# Patient Record
Sex: MALE | Race: WHITE | Employment: FULL TIME | URBAN - METROPOLITAN AREA
[De-identification: names, ages, dates, MRNs, and addresses within clinical notes are randomized per-mention and may not be internally consistent; named-entity substitution may affect disease eponyms.]

---

## 2021-03-21 ENCOUNTER — APPOINTMENT (OUTPATIENT)
Dept: CT IMAGING | Age: 51
DRG: 872 | End: 2021-03-21
Payer: COMMERCIAL

## 2021-03-21 ENCOUNTER — APPOINTMENT (OUTPATIENT)
Dept: ULTRASOUND IMAGING | Age: 51
DRG: 872 | End: 2021-03-21
Payer: COMMERCIAL

## 2021-03-21 ENCOUNTER — HOSPITAL ENCOUNTER (INPATIENT)
Age: 51
LOS: 3 days | Discharge: HOME | DRG: 872 | End: 2021-03-24
Attending: EMERGENCY MEDICINE | Admitting: FAMILY MEDICINE
Payer: COMMERCIAL

## 2021-03-21 DIAGNOSIS — L03.116 CELLULITIS OF LEFT LOWER EXTREMITY: Primary | ICD-10-CM

## 2021-03-21 DIAGNOSIS — D72.829 LEUKOCYTOSIS, UNSPECIFIED TYPE: ICD-10-CM

## 2021-03-21 PROBLEM — L03.90 CELLULITIS: Status: ACTIVE | Noted: 2021-03-21

## 2021-03-21 LAB
ANION GAP SERPL CALCULATED.3IONS-SCNC: 12 MMOL/L (ref 7–16)
BASOPHILS ABSOLUTE: 0.02 E9/L (ref 0–0.2)
BASOPHILS RELATIVE PERCENT: 0.2 % (ref 0–2)
BUN BLDV-MCNC: 13 MG/DL (ref 6–20)
C-REACTIVE PROTEIN: 18.1 MG/DL (ref 0–0.4)
CALCIUM SERPL-MCNC: 9 MG/DL (ref 8.6–10.2)
CHLORIDE BLD-SCNC: 95 MMOL/L (ref 98–107)
CO2: 25 MMOL/L (ref 22–29)
CREAT SERPL-MCNC: 0.9 MG/DL (ref 0.7–1.2)
EOSINOPHILS ABSOLUTE: 0.01 E9/L (ref 0.05–0.5)
EOSINOPHILS RELATIVE PERCENT: 0.1 % (ref 0–6)
GFR AFRICAN AMERICAN: >60
GFR NON-AFRICAN AMERICAN: >60 ML/MIN/1.73
GLUCOSE BLD-MCNC: 111 MG/DL (ref 74–99)
HCT VFR BLD CALC: 42.4 % (ref 37–54)
HEMOGLOBIN: 14.2 G/DL (ref 12.5–16.5)
IMMATURE GRANULOCYTES #: 0.06 E9/L
IMMATURE GRANULOCYTES %: 0.5 % (ref 0–5)
LACTIC ACID, SEPSIS: 1.4 MMOL/L (ref 0.5–1.9)
LYMPHOCYTES ABSOLUTE: 1.05 E9/L (ref 1.5–4)
LYMPHOCYTES RELATIVE PERCENT: 8.8 % (ref 20–42)
MCH RBC QN AUTO: 29.3 PG (ref 26–35)
MCHC RBC AUTO-ENTMCNC: 33.5 % (ref 32–34.5)
MCV RBC AUTO: 87.4 FL (ref 80–99.9)
MONOCYTES ABSOLUTE: 1.29 E9/L (ref 0.1–0.95)
MONOCYTES RELATIVE PERCENT: 10.8 % (ref 2–12)
NEUTROPHILS ABSOLUTE: 9.52 E9/L (ref 1.8–7.3)
NEUTROPHILS RELATIVE PERCENT: 79.6 % (ref 43–80)
PDW BLD-RTO: 12.5 FL (ref 11.5–15)
PLATELET # BLD: 168 E9/L (ref 130–450)
PMV BLD AUTO: 10 FL (ref 7–12)
POTASSIUM REFLEX MAGNESIUM: 3.6 MMOL/L (ref 3.5–5)
RBC # BLD: 4.85 E12/L (ref 3.8–5.8)
SEDIMENTATION RATE, ERYTHROCYTE: 70 MM/HR (ref 0–15)
SODIUM BLD-SCNC: 132 MMOL/L (ref 132–146)
WBC # BLD: 12 E9/L (ref 4.5–11.5)

## 2021-03-21 PROCEDURE — 93971 EXTREMITY STUDY: CPT

## 2021-03-21 PROCEDURE — 73702 CT LWR EXTREMITY W/O&W/DYE: CPT

## 2021-03-21 PROCEDURE — 80048 BASIC METABOLIC PNL TOTAL CA: CPT

## 2021-03-21 PROCEDURE — 36415 COLL VENOUS BLD VENIPUNCTURE: CPT

## 2021-03-21 PROCEDURE — 85025 COMPLETE CBC W/AUTO DIFF WBC: CPT

## 2021-03-21 PROCEDURE — 1200000000 HC SEMI PRIVATE

## 2021-03-21 PROCEDURE — 96365 THER/PROPH/DIAG IV INF INIT: CPT

## 2021-03-21 PROCEDURE — 6370000000 HC RX 637 (ALT 250 FOR IP): Performed by: FAMILY MEDICINE

## 2021-03-21 PROCEDURE — 86140 C-REACTIVE PROTEIN: CPT

## 2021-03-21 PROCEDURE — 83605 ASSAY OF LACTIC ACID: CPT

## 2021-03-21 PROCEDURE — 2500000003 HC RX 250 WO HCPCS: Performed by: STUDENT IN AN ORGANIZED HEALTH CARE EDUCATION/TRAINING PROGRAM

## 2021-03-21 PROCEDURE — 85651 RBC SED RATE NONAUTOMATED: CPT

## 2021-03-21 PROCEDURE — 99284 EMERGENCY DEPT VISIT MOD MDM: CPT

## 2021-03-21 PROCEDURE — 93971 EXTREMITY STUDY: CPT | Performed by: RADIOLOGY

## 2021-03-21 PROCEDURE — 2580000003 HC RX 258: Performed by: STUDENT IN AN ORGANIZED HEALTH CARE EDUCATION/TRAINING PROGRAM

## 2021-03-21 PROCEDURE — 6360000004 HC RX CONTRAST MEDICATION: Performed by: STUDENT IN AN ORGANIZED HEALTH CARE EDUCATION/TRAINING PROGRAM

## 2021-03-21 PROCEDURE — 2500000003 HC RX 250 WO HCPCS: Performed by: FAMILY MEDICINE

## 2021-03-21 PROCEDURE — 6360000002 HC RX W HCPCS: Performed by: FAMILY MEDICINE

## 2021-03-21 PROCEDURE — 6360000002 HC RX W HCPCS: Performed by: INTERNAL MEDICINE

## 2021-03-21 PROCEDURE — 87040 BLOOD CULTURE FOR BACTERIA: CPT

## 2021-03-21 RX ORDER — ATORVASTATIN CALCIUM 40 MG/1
40 TABLET, FILM COATED ORAL DAILY
COMMUNITY

## 2021-03-21 RX ORDER — SODIUM CHLORIDE 0.9 % (FLUSH) 0.9 %
10 SYRINGE (ML) INJECTION ONCE
Status: COMPLETED | OUTPATIENT
Start: 2021-03-21 | End: 2021-03-21

## 2021-03-21 RX ORDER — TAMSULOSIN HYDROCHLORIDE 0.4 MG/1
0.8 CAPSULE ORAL DAILY
COMMUNITY

## 2021-03-21 RX ORDER — CETIRIZINE HYDROCHLORIDE 5 MG/1
10 TABLET ORAL DAILY
COMMUNITY

## 2021-03-21 RX ORDER — HYDROCODONE BITARTRATE AND ACETAMINOPHEN 5; 325 MG/1; MG/1
1 TABLET ORAL EVERY 4 HOURS PRN
Status: DISCONTINUED | OUTPATIENT
Start: 2021-03-21 | End: 2021-03-24 | Stop reason: HOSPADM

## 2021-03-21 RX ORDER — SULFAMETHOXAZOLE AND TRIMETHOPRIM 800; 160 MG/1; MG/1
1 TABLET ORAL 2 TIMES DAILY
COMMUNITY
End: 2021-04-20 | Stop reason: ALTCHOICE

## 2021-03-21 RX ORDER — ALBUTEROL SULFATE 0.63 MG/3ML
1 SOLUTION RESPIRATORY (INHALATION) EVERY 6 HOURS PRN
COMMUNITY
End: 2022-05-16

## 2021-03-21 RX ORDER — CLINDAMYCIN PHOSPHATE 600 MG/50ML
600 INJECTION INTRAVENOUS EVERY 8 HOURS
Status: DISCONTINUED | OUTPATIENT
Start: 2021-03-21 | End: 2021-03-23

## 2021-03-21 RX ORDER — CLINDAMYCIN PHOSPHATE 600 MG/50ML
600 INJECTION INTRAVENOUS ONCE
Status: COMPLETED | OUTPATIENT
Start: 2021-03-21 | End: 2021-03-21

## 2021-03-21 RX ORDER — MONTELUKAST SODIUM 10 MG/1
10 TABLET ORAL NIGHTLY
COMMUNITY

## 2021-03-21 RX ADMIN — Medication 2000 MG: at 20:16

## 2021-03-21 RX ADMIN — HYDROCODONE BITARTRATE AND ACETAMINOPHEN 1 TABLET: 5; 325 TABLET ORAL at 16:20

## 2021-03-21 RX ADMIN — CLINDAMYCIN PHOSPHATE 600 MG: 600 INJECTION, SOLUTION INTRAVENOUS at 18:18

## 2021-03-21 RX ADMIN — IOPAMIDOL 90 ML: 755 INJECTION, SOLUTION INTRAVENOUS at 11:02

## 2021-03-21 RX ADMIN — HYDROCODONE BITARTRATE AND ACETAMINOPHEN 1 TABLET: 5; 325 TABLET ORAL at 20:15

## 2021-03-21 RX ADMIN — CLINDAMYCIN PHOSPHATE 600 MG: 600 INJECTION, SOLUTION INTRAVENOUS at 10:51

## 2021-03-21 RX ADMIN — ENOXAPARIN SODIUM 40 MG: 40 INJECTION SUBCUTANEOUS at 20:15

## 2021-03-21 RX ADMIN — Medication 10 ML: at 11:02

## 2021-03-21 ASSESSMENT — PAIN SCALES - GENERAL: PAINLEVEL_OUTOF10: 8

## 2021-03-21 ASSESSMENT — PAIN DESCRIPTION - DESCRIPTORS
DESCRIPTORS: DISCOMFORT;SORE;DULL
DESCRIPTORS: DISCOMFORT

## 2021-03-21 ASSESSMENT — PAIN DESCRIPTION - ORIENTATION
ORIENTATION: LEFT
ORIENTATION: LEFT

## 2021-03-21 ASSESSMENT — PAIN DESCRIPTION - LOCATION: LOCATION: LEG

## 2021-03-21 ASSESSMENT — PAIN DESCRIPTION - ONSET: ONSET: ON-GOING

## 2021-03-21 NOTE — ED PROVIDER NOTES
Department of Emergency Medicine   ED  Provider Note  Admit Date/RoomTime: 3/21/2021  6:36 AM  ED Room: [facility]          History of Present Illness:  3/21/21, Time: 6:37 AM EDT  Chief Complaint   Patient presents with    Cellulitis     left leg. Yoko Zuñiga is a 46 y.o. male presenting to the ED for cellulitis, beginning several days ago. The complaint has been constant, moderate in severity, and worsened by nothing. The patient was given bactrim from urgent care. There are no alleviating factors. The patient is a 68-year-old male with a history of hyperlipidemia and BPH who presents to the emergency department complaining of cellulitis of the left leg. The patient states that over the past several days he has noticed increased redness of the left lower extremity. The patient states that he went to urgent care center yesterday and they did give him a shot of antibiotics and he also was sent home on Bactrim. He states he has been taking that as prescribed. However, last night he developed a temperature over 100. The patient states that he had something similar many years ago that resolved with oral antibiotics. The patient states he also has been experiencing intermittent fatigue and chills. He denies any chest pain, lightheadedness, dizziness, syncope, history of blood clots or bleeding disorders, history of DVT or PE, history of diabetes, recent hospitalization, recent illness, or other acute symptoms or concerns. Review of Systems:   Review of Systems   Constitutional: Positive for fatigue and fever. Negative for chills. HENT: Negative for congestion. Eyes: Negative for redness. Respiratory: Negative for shortness of breath. Cardiovascular: Negative for chest pain. Gastrointestinal: Negative for abdominal pain, nausea and vomiting. Genitourinary: Negative for dysuria. Musculoskeletal: Negative for arthralgias. Skin: Positive for color change and rash.  Negative for wound. Neurological: Negative for light-headedness. Psychiatric/Behavioral: Negative for confusion. All other systems reviewed and are negative.          --------------------------------------------- PAST HISTORY ---------------------------------------------  Past Medical History:  has a past medical history of Asthma. Past Surgical History:  has no past surgical history on file. Social History:  reports that he has never smoked. He has never used smokeless tobacco. He reports previous alcohol use. He reports that he does not use drugs. Family History: family history is not on file. . Unless otherwise noted, family history is non contributory    The patients home medications have been reviewed. Allergies: Neosporin plus max st and Other    I have reviewed the past medical history, past surgical history, social history, and family history    ---------------------------------------------------PHYSICAL EXAM--------------------------------------    Constitutional/General: Alert and oriented x3  Head: Normocephalic and atraumatic  Eyes: PERRL, EOMI, sclera non icteric  Mouth: Oropharynx clear, handling secretions, no trismus, no asymmetry of the posterior oropharynx or uvular edema  Neck: Supple, full ROM, no stridor, no meningeal signs  Respiratory: Lungs clear to auscultation bilaterally, no wheezes, rales, or rhonchi. Not in respiratory distress  Cardiovascular:  Regular rate. Regular rhythm. No murmurs, no aortic murmurs, no gallops, or rubs. Chest: No chest wall tenderness  Gastrointestinal:  Abdomen Soft, Non tender, Non distended. No rebound, guarding, or rigidity. No pulsatile masses. Musculoskeletal: Moves all extremities x 4. Warm and well perfused, no clubbing, no cyanosis. Capillary refill <3 seconds. Moderate edema of the left lower extremity with erythema and warmth. It is tender to palpation. DP pulses are +2 bilaterally. Compartments are soft and compressible.   There are several ulcerations over the medial and lateral aspect. There is no active drainage or discharge. Skin: skin warm and dry. No rashes. Neurologic: GCS 15, no focal deficits, symmetric strength 5/5 in the upper and lower extremities bilaterally  Psychiatric: Normal Affect                  -------------------------------------------------- RESULTS -------------------------------------------------  I have personally reviewed all laboratory and imaging results for this patient. Results are listed below.      LABS: (Lab results interpreted by me)  Results for orders placed or performed during the hospital encounter of 03/21/21   CBC auto differential   Result Value Ref Range    WBC 12.0 (H) 4.5 - 11.5 E9/L    RBC 4.85 3.80 - 5.80 E12/L    Hemoglobin 14.2 12.5 - 16.5 g/dL    Hematocrit 42.4 37.0 - 54.0 %    MCV 87.4 80.0 - 99.9 fL    MCH 29.3 26.0 - 35.0 pg    MCHC 33.5 32.0 - 34.5 %    RDW 12.5 11.5 - 15.0 fL    Platelets 878 954 - 814 E9/L    MPV 10.0 7.0 - 12.0 fL    Neutrophils % 79.6 43.0 - 80.0 %    Immature Granulocytes % 0.5 0.0 - 5.0 %    Lymphocytes % 8.8 (L) 20.0 - 42.0 %    Monocytes % 10.8 2.0 - 12.0 %    Eosinophils % 0.1 0.0 - 6.0 %    Basophils % 0.2 0.0 - 2.0 %    Neutrophils Absolute 9.52 (H) 1.80 - 7.30 E9/L    Immature Granulocytes # 0.06 E9/L    Lymphocytes Absolute 1.05 (L) 1.50 - 4.00 E9/L    Monocytes Absolute 1.29 (H) 0.10 - 0.95 E9/L    Eosinophils Absolute 0.01 (L) 0.05 - 0.50 E9/L    Basophils Absolute 0.02 0.00 - 0.20 Q9/I   Basic Metabolic Panel w/ Reflex to MG   Result Value Ref Range    Sodium 132 132 - 146 mmol/L    Potassium reflex Magnesium 3.6 3.5 - 5.0 mmol/L    Chloride 95 (L) 98 - 107 mmol/L    CO2 25 22 - 29 mmol/L    Anion Gap 12 7 - 16 mmol/L    Glucose 111 (H) 74 - 99 mg/dL    BUN 13 6 - 20 mg/dL    CREATININE 0.9 0.7 - 1.2 mg/dL    GFR Non-African American >60 >=60 mL/min/1.73    GFR African American >60     Calcium 9.0 8.6 - 10.2 mg/dL   Lactate, Sepsis   Result Value Ref Range    Lactic Acid, Sepsis 1.4 0.5 - 1.9 mmol/L   SEDIMENTATION RATE   Result Value Ref Range    Sed Rate 70 (H) 0 - 15 mm/Hr   C-REACTIVE PROTEIN   Result Value Ref Range    CRP 18.1 (H) 0.0 - 0.4 mg/dL   ,       RADIOLOGY:  Interpreted by Radiologist unless otherwise specified  CT TIBIA FIBULA LEFT W WO CONTRAST   Preliminary Result   No evidence of soft tissue gas or a drainable abscess. Generalized   subcutaneous edema about the visualized left lower extremity compatible with   cellulitis. No acute bony abnormality is seen. US DUP LOWER EXTREMITY LEFT MARCELO   Final Result   Within the visualized vessels there is no evidence for deep venous   thrombosis                     ------------------------- NURSING NOTES AND VITALS REVIEWED ---------------------------   The nursing notes within the ED encounter and vital signs as below have been reviewed by myself  /76   Pulse 92   Temp 96.8 °F (36 °C)   Resp 18   Ht 5' 10\" (1.778 m)   Wt (!) 317 lb (143.8 kg)   SpO2 98%   BMI 45.48 kg/m²     Oxygen Saturation Interpretation: 98 % on room air. Normal    The patients available past medical records and past encounters were reviewed. ------------------------------ ED COURSE/MEDICAL DECISION MAKING----------------------  Medications   clindamycin (CLEOCIN) 600 mg in dextrose 5 % 50 mL IVPB (600 mg Intravenous New Bag 3/21/21 1051)   sodium chloride flush 0.9 % injection 10 mL (10 mLs Intravenous Given 3/21/21 1102)   iopamidol (ISOVUE-370) 76 % injection 90 mL (90 mLs Intravenous Given 3/21/21 1102)             Medical Decision Making:     The patient was seen and evaluated by the Attending Emergency Medicine Physician Dr. Anthony Romero. The patient is a 49-year-old male presents the emergency department complaining of cellulitis. He is hemodynamically stable, nontoxic in appearance, in no acute distress. There is significant erythema, edema, and tenderness over the left lower extremity. Compartments are soft and compressible, sensation is intact and equal bilateral lower extremities, DP pulses are +2 bilaterally. No evidence of necrotizing fasciitis. Obtain ultrasound which was negative for DVT, CT scan does not show any evidence of abscess formation or gas formation. There is a slight leukocytosis and elevation of ESR and CRP. Otherwise labs are within normal limits. Treated patient with IV clindamycin. Consulted with hospitalist who accepted the patient for admission. Discussed results and plan of care with patient and family member at bedside they verbalized understanding and agreement to treatment plan and admission. Re-Evaluations:  ED Course as of Mar 21 1353   Sun Mar 21, 2021   1257 Consulted with Dr. Elvira Stahl, hospitalist, who accepted for admission. [KG]      ED Course User Index  [KG] Minal Archibald,        This patient's ED course included: a personal history and physicial examination, re-evaluation prior to disposition, multiple bedside re-evaluations, IV medications, cardiac monitoring, continuous pulse oximetry and complex medical decision making and emergency management    This patient has remained hemodynamically stable during their ED course. Consultations:  Consulted with hospitalist who accepted for admission. Counseling: The emergency provider has spoken with the patient and discussed todays results, in addition to providing specific details for the plan of care and counseling regarding the diagnosis and prognosis. Questions are answered at this time and they are agreeable with the plan.       --------------------------------- IMPRESSION AND DISPOSITION ---------------------------------    IMPRESSION  1. Cellulitis of left lower extremity    2. Leukocytosis, unspecified type        DISPOSITION  Disposition: Admit to med/surg floor  Patient condition is stable        NOTE: This report was transcribed using voice recognition software. Every effort was made to ensure accuracy; however, inadvertent computerized transcription errors may be present        Manuel Mckeon DO  Resident  03/21/21 6329    ATTENDING PROVIDER ATTESTATION:     Tana Erwin presented to the emergency department for evaluation of Cellulitis (left leg.)    I have reviewed and discussed the case, including pertinent history (medical, surgical, family and social) and exam findings with the Resident and the Nurse assigned to Tana Erwin. I have personally performed and/or participated in the history, exam, medical decision making, and procedures and agree with all pertinent clinical information. I have reviewed my findings and recommendations with Tana Erwin and members of family present at the time of disposition. I, Dr. London Sahu am the primary physician of record for this patient. MDM: The patient is 46 y.o. male  with a past medical history of       Diagnosis Date    Asthma      presenting to the emergency department with a chief complaint of fever, fatigue and leg swelling. Differential diagnosis includes not limited to, cellulitis, necrotizing fasciitis, DVT. The patient did have labs and imaging which were reviewed. Patient did have mild leukocytosis of 12.0, BMP unremarkable, lactic acid 1.4, ESR and CRP elevated, CT tib-fib with no subcutaneous emphysema. Patient with ultrasound with no evidence of DVT. Patient will be admitted for further treatment and evaluation. My findings/plan: The primary encounter diagnosis was Cellulitis of left lower extremity. A diagnosis of Leukocytosis, unspecified type was also pertinent to this visit.   Discharge Medication List as of 3/24/2021  4:17 PM      START taking these medications    Details   vancomycin (VANCOCIN) infusion Infuse 1,500 mg intravenously every 8 hours for 21 days Compound per protocol., Disp-24222 mg, R-0Print      DAPTOmycin (CUBICIN) 500 MG injection Infuse 9 mLs intravenously daily for 21 days, Disp-189 mL, R-0Print           Kenzie Irene, [address], DO  03/25/21 4672

## 2021-03-22 LAB
ANION GAP SERPL CALCULATED.3IONS-SCNC: 8 MMOL/L (ref 7–16)
ANTISTREPTOLYSIN-O: 99 IU/ML (ref 0–200)
BUN BLDV-MCNC: 12 MG/DL (ref 6–20)
CALCIUM SERPL-MCNC: 8.7 MG/DL (ref 8.6–10.2)
CHLORIDE BLD-SCNC: 98 MMOL/L (ref 98–107)
CO2: 31 MMOL/L (ref 22–29)
CREAT SERPL-MCNC: 0.9 MG/DL (ref 0.7–1.2)
GFR AFRICAN AMERICAN: >60
GFR NON-AFRICAN AMERICAN: >60 ML/MIN/1.73
GLUCOSE BLD-MCNC: 107 MG/DL (ref 74–99)
HCT VFR BLD CALC: 37.7 % (ref 37–54)
HEMOGLOBIN: 12.7 G/DL (ref 12.5–16.5)
MCH RBC QN AUTO: 29.3 PG (ref 26–35)
MCHC RBC AUTO-ENTMCNC: 33.7 % (ref 32–34.5)
MCV RBC AUTO: 86.9 FL (ref 80–99.9)
PDW BLD-RTO: 12.4 FL (ref 11.5–15)
PLATELET # BLD: 189 E9/L (ref 130–450)
PMV BLD AUTO: 10.1 FL (ref 7–12)
POTASSIUM SERPL-SCNC: 3.5 MMOL/L (ref 3.5–5)
RBC # BLD: 4.34 E12/L (ref 3.8–5.8)
SODIUM BLD-SCNC: 137 MMOL/L (ref 132–146)
WBC # BLD: 11.1 E9/L (ref 4.5–11.5)

## 2021-03-22 PROCEDURE — 86060 ANTISTREPTOLYSIN O TITER: CPT

## 2021-03-22 PROCEDURE — 2580000003 HC RX 258: Performed by: FAMILY MEDICINE

## 2021-03-22 PROCEDURE — 6360000002 HC RX W HCPCS: Performed by: FAMILY MEDICINE

## 2021-03-22 PROCEDURE — 80048 BASIC METABOLIC PNL TOTAL CA: CPT

## 2021-03-22 PROCEDURE — 6360000002 HC RX W HCPCS: Performed by: INTERNAL MEDICINE

## 2021-03-22 PROCEDURE — 85027 COMPLETE CBC AUTOMATED: CPT

## 2021-03-22 PROCEDURE — 36415 COLL VENOUS BLD VENIPUNCTURE: CPT

## 2021-03-22 PROCEDURE — 2500000003 HC RX 250 WO HCPCS: Performed by: FAMILY MEDICINE

## 2021-03-22 PROCEDURE — 1200000000 HC SEMI PRIVATE

## 2021-03-22 RX ORDER — SODIUM CHLORIDE 0.9 % (FLUSH) 0.9 %
10 SYRINGE (ML) INJECTION PRN
Status: DISCONTINUED | OUTPATIENT
Start: 2021-03-22 | End: 2021-03-24 | Stop reason: HOSPADM

## 2021-03-22 RX ORDER — SODIUM CHLORIDE 0.9 % (FLUSH) 0.9 %
10 SYRINGE (ML) INJECTION 2 TIMES DAILY
Status: DISCONTINUED | OUTPATIENT
Start: 2021-03-22 | End: 2021-03-24 | Stop reason: HOSPADM

## 2021-03-22 RX ADMIN — ENOXAPARIN SODIUM 40 MG: 40 INJECTION SUBCUTANEOUS at 10:40

## 2021-03-22 RX ADMIN — CLINDAMYCIN PHOSPHATE 600 MG: 600 INJECTION, SOLUTION INTRAVENOUS at 10:40

## 2021-03-22 RX ADMIN — CLINDAMYCIN PHOSPHATE 600 MG: 600 INJECTION, SOLUTION INTRAVENOUS at 19:49

## 2021-03-22 RX ADMIN — Medication 10 ML: at 10:39

## 2021-03-22 RX ADMIN — Medication 2000 MG: at 19:49

## 2021-03-22 RX ADMIN — CLINDAMYCIN PHOSPHATE 600 MG: 600 INJECTION, SOLUTION INTRAVENOUS at 02:47

## 2021-03-22 RX ADMIN — Medication 10 ML: at 20:42

## 2021-03-22 RX ADMIN — Medication 2000 MG: at 02:47

## 2021-03-22 RX ADMIN — Medication 2000 MG: at 10:40

## 2021-03-22 ASSESSMENT — ENCOUNTER SYMPTOMS
SHORTNESS OF BREATH: 0
ABDOMINAL PAIN: 0

## 2021-03-22 ASSESSMENT — PAIN SCALES - GENERAL
PAINLEVEL_OUTOF10: 0
PAINLEVEL_OUTOF10: 0

## 2021-03-22 NOTE — H&P
HISTORY AND PHYSICAL             Date: 3/22/2021        Patient Name: Sonja Huntley     YOB: 1970      Age:  46 y.o. Chief Complaint     Chief Complaint   Patient presents with    Cellulitis     left leg. History Obtained From   patient    History of Present Illness   Patient was given oral Bactrim outpatient for a LLE cellulitis. He says he only took one pill and came to the ER yesterday because of fevers and worsening leg pain. Past Medical History     Past Medical History:   Diagnosis Date    Asthma         Past Surgical History   History reviewed. No pertinent surgical history. Medications Prior to Admission     Prior to Admission medications    Medication Sig Start Date End Date Taking? Authorizing Provider   cetirizine (ZYRTEC) 5 MG tablet Take 5 mg by mouth daily   Yes Historical Provider, MD   montelukast (SINGULAIR) 10 MG tablet Take 10 mg by mouth nightly   Yes Historical Provider, MD   tamsulosin (FLOMAX) 0.4 MG capsule Take 0.8 mg by mouth daily   Yes Historical Provider, MD   atorvastatin (LIPITOR) 40 MG tablet Take 40 mg by mouth daily   Yes Historical Provider, MD   albuterol (ACCUNEB) 0.63 MG/3ML nebulizer solution Take 1 ampule by nebulization every 6 hours as needed for Wheezing   Yes Historical Provider, MD   sulfamethoxazole-trimethoprim (BACTRIM DS;SEPTRA DS) 800-160 MG per tablet Take 1 tablet by mouth 2 times daily   Yes Historical Provider, MD        Allergies   Neosporin plus max st and Other    Social History     Social History     Tobacco History     Smoking Status  Never Smoker    Smokeless Tobacco Use  Never Used          Alcohol History     Alcohol Use Status  Not Currently          Drug Use     Drug Use Status  Never          Sexual Activity     Sexually Active  Not Asked                Family History   History reviewed. No pertinent family history. Review of Systems   Review of Systems   Constitutional: Positive for activity change.    HENT: Negative for congestion. Respiratory: Negative for shortness of breath. Cardiovascular: Negative for chest pain. Gastrointestinal: Negative for abdominal pain. Genitourinary: Negative for difficulty urinating. Musculoskeletal: Negative for arthralgias. Skin: Positive for rash. Neurological: Negative for dizziness. Physical Exam   /73   Pulse 89   Temp 98.1 °F (36.7 °C) (Temporal)   Resp 19   Ht 5' 10\" (1.778 m)   Wt (!) 317 lb (143.8 kg)   SpO2 95%   BMI 45.48 kg/m²     Physical Exam  Vitals signs reviewed. HENT:      Head: Normocephalic. Mouth/Throat:      Mouth: Mucous membranes are moist.   Eyes:      Pupils: Pupils are equal, round, and reactive to light. Cardiovascular:      Rate and Rhythm: Normal rate and regular rhythm. Pulses: Normal pulses. Heart sounds: Normal heart sounds. Pulmonary:      Effort: Pulmonary effort is normal. No respiratory distress. Breath sounds: Normal breath sounds. No wheezing. Abdominal:      General: Abdomen is flat. Bowel sounds are normal. There is no distension. Skin:     Capillary Refill: Capillary refill takes less than 2 seconds. Comments: LLE erythematous and warm   Neurological:      General: No focal deficit present. Mental Status: He is alert and oriented to person, place, and time.    Psychiatric:         Mood and Affect: Mood normal.         Behavior: Behavior normal.         Labs      Recent Results (from the past 24 hour(s))   CBC auto differential    Collection Time: 03/21/21  8:00 AM   Result Value Ref Range    WBC 12.0 (H) 4.5 - 11.5 E9/L    RBC 4.85 3.80 - 5.80 E12/L    Hemoglobin 14.2 12.5 - 16.5 g/dL    Hematocrit 42.4 37.0 - 54.0 %    MCV 87.4 80.0 - 99.9 fL    MCH 29.3 26.0 - 35.0 pg    MCHC 33.5 32.0 - 34.5 %    RDW 12.5 11.5 - 15.0 fL    Platelets 127 074 - 758 E9/L    MPV 10.0 7.0 - 12.0 fL    Neutrophils % 79.6 43.0 - 80.0 %    Immature Granulocytes % 0.5 0.0 - 5.0 %    Lymphocytes % 8.8 (L) 20.0 - 42.0 %    Monocytes % 10.8 2.0 - 12.0 %    Eosinophils % 0.1 0.0 - 6.0 %    Basophils % 0.2 0.0 - 2.0 %    Neutrophils Absolute 9.52 (H) 1.80 - 7.30 E9/L    Immature Granulocytes # 0.06 E9/L    Lymphocytes Absolute 1.05 (L) 1.50 - 4.00 E9/L    Monocytes Absolute 1.29 (H) 0.10 - 0.95 E9/L    Eosinophils Absolute 0.01 (L) 0.05 - 0.50 E9/L    Basophils Absolute 0.02 0.00 - 0.20 V5/A   Basic Metabolic Panel w/ Reflex to MG    Collection Time: 03/21/21  8:00 AM   Result Value Ref Range    Sodium 132 132 - 146 mmol/L    Potassium reflex Magnesium 3.6 3.5 - 5.0 mmol/L    Chloride 95 (L) 98 - 107 mmol/L    CO2 25 22 - 29 mmol/L    Anion Gap 12 7 - 16 mmol/L    Glucose 111 (H) 74 - 99 mg/dL    BUN 13 6 - 20 mg/dL    CREATININE 0.9 0.7 - 1.2 mg/dL    GFR Non-African American >60 >=60 mL/min/1.73    GFR African American >60     Calcium 9.0 8.6 - 10.2 mg/dL   Lactate, Sepsis    Collection Time: 03/21/21  8:00 AM   Result Value Ref Range    Lactic Acid, Sepsis 1.4 0.5 - 1.9 mmol/L   SEDIMENTATION RATE    Collection Time: 03/21/21  8:00 AM   Result Value Ref Range    Sed Rate 70 (H) 0 - 15 mm/Hr   C-REACTIVE PROTEIN    Collection Time: 03/21/21  8:00 AM   Result Value Ref Range    CRP 18.1 (H) 0.0 - 0.4 mg/dL   CBC    Collection Time: 03/22/21  5:08 AM   Result Value Ref Range    WBC 11.1 4.5 - 11.5 E9/L    RBC 4.34 3.80 - 5.80 E12/L    Hemoglobin 12.7 12.5 - 16.5 g/dL    Hematocrit 37.7 37.0 - 54.0 %    MCV 86.9 80.0 - 99.9 fL    MCH 29.3 26.0 - 35.0 pg    MCHC 33.7 32.0 - 34.5 %    RDW 12.4 11.5 - 15.0 fL    Platelets 248 971 - 215 E9/L    MPV 10.1 7.0 - 12.0 fL   Basic metabolic panel    Collection Time: 03/22/21  5:08 AM   Result Value Ref Range    Sodium 137 132 - 146 mmol/L    Potassium 3.5 3.5 - 5.0 mmol/L    Chloride 98 98 - 107 mmol/L    CO2 31 (H) 22 - 29 mmol/L    Anion Gap 8 7 - 16 mmol/L    Glucose 107 (H) 74 - 99 mg/dL    BUN 12 6 - 20 mg/dL    CREATININE 0.9 0.7 - 1.2 mg/dL    GFR Non- 46 years Gender: Male Order Date:  3/21/2021 8:19 AM EXAM: US DUP LOWER EXTREMITY LEFT MARCELO NUMBER OF IMAGES:  33 INDICATION:  dvt dvt What reading provider will be dictating this exam?->[facility] COMPARISON: None Within the visualized vessels, there is no evidence for deep venous thrombosis There is good compressibility, there is good augmentation, there is good color flow. Within the visualized vessels there is no evidence for deep venous thrombosis       Assessment      Hospital Problems           Last Modified POA    Cellulitis 3/21/2021 Yes      Asthma    Plan   Started on IV clinda in the ER  ID to see. Monitor labs and exam.  No DVT.     Consultations Ordered:  IP CONSULT TO HOSPITALIST  IP CONSULT TO INFECTIOUS DISEASES    Electronically signed by Urbano Halsted, MD on 3/22/21 at 6:38 AM EDT

## 2021-03-22 NOTE — CONSULTS
member of club or organization: None     Attends meetings of clubs or organizations: None     Relationship status: None    Intimate partner violence     Fear of current or ex partner: None     Emotionally abused: None     Physically abused: None     Forced sexual activity: None   Other Topics Concern    None   Social History Narrative    None     Tobacco: No  Alcohol: No  Pets: No  Travel: No    Family History:   History reviewed. No pertinent family history. . Otherwise non-pertinent to the chief complaint. REVIEW OF SYSTEMS:    CONSTITUTIONAL:  No chills, fevers or night sweats. No loss of weight. EYES:  No double vision or drainage from eyes, ears or throat. HEENT:  No neck stiffness. No dysphagia. No drainage from eyes, ears or throat  RESPIRATORY:  No cough, productive sputum or hemoptysis. CARDIOVASCULAR:  No chest pain, palpitations, orthopnea or dyspnea on exertion. GASTROINTESTINAL:  No nausea, vomiting, diarrhea or constipation or hematochezia   GENITOURINARY:  No frequency burning dysuria or hematuria. INTEGUMENT/BREAST:  No rash or breast masses. HEMATOLOGIC/LYMPHATIC:  No lymphadenopathy or blood dyscrasics. ALLERGIC/IMMUNOLOGIC:  No anaphylaxis. ENDOCRINE:  No polyuria or polydipsia or temperature intolerance. MUSCULOSKELETAL:  No myalgia or arthralgia. Full ROM. NEUROLOGICAL:  No focal motor sensory deficit. BEHAVIOR/PSYCH:  No psychosis. PHYSICAL EXAM:    Vitals:    /71   Pulse 89   Temp 97.4 °F (36.3 °C) (Temporal)   Resp 18   Ht 5' 10\" (1.778 m)   Wt (!) 317 lb (143.8 kg)   SpO2 97%   BMI 45.48 kg/m²   Constitutional: The patient is awake, alert, and oriented. Skin: Warm and dry. No rashes were noted. HEENT: Eyes show round, and reactive pupils. No jaundice. Moist mucous membranes, no ulcerations, no thrush. Neck: Supple to movements. No lymphadenopathy. Chest: No use of accessory muscles to breathe. Symmetrical expansion.  Auscultation reveals no wheezing, crackles, or rhonchi. Cardiovascular: S1 and S2 are rhythmic and regular. No murmurs appreciated. Abdomen: Positive bowel sounds to auscultation. Benign to palpation. No masses felt. No hepatosplenomegaly. Extremities: No clubbing, no cyanosis, no edema. Lines: peripheral      CBC+dif:  Recent Labs     03/21/21  0800 03/22/21  0508   WBC 12.0* 11.1   HGB 14.2 12.7   HCT 42.4 37.7   MCV 87.4 86.9    189   NEUTROABS 9.52*  --      Lab Results   Component Value Date    CRP 18.1 (H) 03/21/2021     No results found for: CRPHS  Lab Results   Component Value Date    SEDRATE 70 (H) 03/21/2021    SEDRATE 3 10/12/2011    SEDRATE 73 (H) 11/09/2010     Lab Results   Component Value Date    ALT 41 (H) 01/29/2016    AST 25 01/29/2016    ALKPHOS 78 01/29/2016    BILITOT 0.2 01/29/2016     Lab Results   Component Value Date     03/22/2021    K 3.5 03/22/2021    K 3.6 03/21/2021    CL 98 03/22/2021    CO2 31 03/22/2021    BUN 12 03/22/2021    CREATININE 0.9 03/22/2021    GFRAA >60 03/22/2021    LABGLOM >60 03/22/2021    GLUCOSE 107 03/22/2021    GLUCOSE 91 10/12/2011    PROT 6.6 01/29/2016    LABALBU 4.4 01/29/2016    LABALBU 4.5 10/12/2011    CALCIUM 8.7 03/22/2021    BILITOT 0.2 01/29/2016    ALKPHOS 78 01/29/2016    AST 25 01/29/2016    ALT 41 01/29/2016       Lab Results   Component Value Date    PROTIME 12.1 11/09/2010    INR 1.2 11/09/2010       Lab Results   Component Value Date    TSH 2.767 10/12/2011       No results found for: NITRITE, COLORU, PHUR, LABCAST, WBCUA, RBCUA, MUCUS, TRICHOMONAS, YEAST, BACTERIA, CLARITYU, SPECGRAV, LEUKOCYTESUR, UROBILINOGEN, BILIRUBINUR, BLOODU, GLUCOSEU, AMORPHOUS    No results found for: XQC3CMS, BEART, X2EPYAMD, PHART, THGBART, UBS6DZG, PO2ART, AIL5JKP  Radiology:  CT TIBIA FIBULA LEFT W WO CONTRAST   Final Result   No evidence of soft tissue gas or a drainable abscess.   Generalized   subcutaneous edema about the visualized left lower extremity compatible with cellulitis. No acute bony abnormality is seen. US DUP LOWER EXTREMITY LEFT MARCELO   Final Result   Within the visualized vessels there is no evidence for deep venous   thrombosis                   Microbiology:  Pending  Recent Labs     03/21/21  0801   BC 24 Hours no growth     No results for input(s): ORG in the last 72 hours. Recent Labs     03/21/21  0801   BLOODCULT2 24 Hours no growth     No results for input(s): STREPNEUMAGU in the last 72 hours. No results for input(s): LP1UAG in the last 72 hours. No results for input(s): ASO in the last 72 hours. No results for input(s): CULTRESP in the last 72 hours. Assessment:  · LLE cellulitis  · Await cutlures  · Check aso titer     Plan:    · Cont ancef and clindamycin  · Check cultures  · Baseline ESR, CRP  · Monitor labs  · Will follow with you    Thank you for having us see this patient in consultation. I will be discussing this case with the treating physicians.       Electronically signed by Froilan Moya MD on 3/22/2021 at 12:05 PM

## 2021-03-23 LAB
ANION GAP SERPL CALCULATED.3IONS-SCNC: 9 MMOL/L (ref 7–16)
BUN BLDV-MCNC: 13 MG/DL (ref 6–20)
CALCIUM SERPL-MCNC: 9 MG/DL (ref 8.6–10.2)
CHLORIDE BLD-SCNC: 96 MMOL/L (ref 98–107)
CO2: 31 MMOL/L (ref 22–29)
CREAT SERPL-MCNC: 0.8 MG/DL (ref 0.7–1.2)
GFR AFRICAN AMERICAN: >60
GFR NON-AFRICAN AMERICAN: >60 ML/MIN/1.73
GLUCOSE BLD-MCNC: 109 MG/DL (ref 74–99)
HCT VFR BLD CALC: 42.5 % (ref 37–54)
HEMOGLOBIN: 14.2 G/DL (ref 12.5–16.5)
MCH RBC QN AUTO: 29.6 PG (ref 26–35)
MCHC RBC AUTO-ENTMCNC: 33.4 % (ref 32–34.5)
MCV RBC AUTO: 88.5 FL (ref 80–99.9)
PDW BLD-RTO: 12.6 FL (ref 11.5–15)
PLATELET # BLD: 234 E9/L (ref 130–450)
PMV BLD AUTO: 10.1 FL (ref 7–12)
POTASSIUM SERPL-SCNC: 3.6 MMOL/L (ref 3.5–5)
RBC # BLD: 4.8 E12/L (ref 3.8–5.8)
SODIUM BLD-SCNC: 136 MMOL/L (ref 132–146)
WBC # BLD: 14.2 E9/L (ref 4.5–11.5)

## 2021-03-23 PROCEDURE — 85027 COMPLETE CBC AUTOMATED: CPT

## 2021-03-23 PROCEDURE — 6360000002 HC RX W HCPCS: Performed by: INTERNAL MEDICINE

## 2021-03-23 PROCEDURE — 36415 COLL VENOUS BLD VENIPUNCTURE: CPT

## 2021-03-23 PROCEDURE — 80048 BASIC METABOLIC PNL TOTAL CA: CPT

## 2021-03-23 PROCEDURE — 2500000003 HC RX 250 WO HCPCS: Performed by: INTERNAL MEDICINE

## 2021-03-23 PROCEDURE — 6370000000 HC RX 637 (ALT 250 FOR IP): Performed by: FAMILY MEDICINE

## 2021-03-23 PROCEDURE — 1200000000 HC SEMI PRIVATE

## 2021-03-23 PROCEDURE — 2580000003 HC RX 258: Performed by: INTERNAL MEDICINE

## 2021-03-23 PROCEDURE — 2500000003 HC RX 250 WO HCPCS: Performed by: FAMILY MEDICINE

## 2021-03-23 PROCEDURE — 2580000003 HC RX 258: Performed by: FAMILY MEDICINE

## 2021-03-23 RX ORDER — CETIRIZINE HYDROCHLORIDE 10 MG/1
5 TABLET ORAL DAILY
Status: DISCONTINUED | OUTPATIENT
Start: 2021-03-23 | End: 2021-03-24 | Stop reason: HOSPADM

## 2021-03-23 RX ORDER — ATORVASTATIN CALCIUM 40 MG/1
40 TABLET, FILM COATED ORAL DAILY
Status: DISCONTINUED | OUTPATIENT
Start: 2021-03-23 | End: 2021-03-24 | Stop reason: HOSPADM

## 2021-03-23 RX ORDER — LIDOCAINE HYDROCHLORIDE 10 MG/ML
5 INJECTION, SOLUTION EPIDURAL; INFILTRATION; INTRACAUDAL; PERINEURAL ONCE
Status: DISCONTINUED | OUTPATIENT
Start: 2021-03-23 | End: 2021-03-24 | Stop reason: HOSPADM

## 2021-03-23 RX ORDER — SODIUM CHLORIDE 0.9 % (FLUSH) 0.9 %
10 SYRINGE (ML) INJECTION PRN
Status: DISCONTINUED | OUTPATIENT
Start: 2021-03-23 | End: 2021-03-24 | Stop reason: HOSPADM

## 2021-03-23 RX ORDER — HEPARIN SODIUM (PORCINE) LOCK FLUSH IV SOLN 100 UNIT/ML 100 UNIT/ML
3 SOLUTION INTRAVENOUS PRN
Status: DISCONTINUED | OUTPATIENT
Start: 2021-03-23 | End: 2021-03-24 | Stop reason: HOSPADM

## 2021-03-23 RX ORDER — ALBUTEROL SULFATE 0.63 MG/3ML
1 SOLUTION RESPIRATORY (INHALATION) EVERY 6 HOURS PRN
Status: DISCONTINUED | OUTPATIENT
Start: 2021-03-23 | End: 2021-03-24 | Stop reason: HOSPADM

## 2021-03-23 RX ORDER — HEPARIN SODIUM (PORCINE) LOCK FLUSH IV SOLN 100 UNIT/ML 100 UNIT/ML
3 SOLUTION INTRAVENOUS EVERY 12 HOURS SCHEDULED
Status: DISCONTINUED | OUTPATIENT
Start: 2021-03-23 | End: 2021-03-24 | Stop reason: HOSPADM

## 2021-03-23 RX ORDER — MONTELUKAST SODIUM 10 MG/1
10 TABLET ORAL NIGHTLY
Status: DISCONTINUED | OUTPATIENT
Start: 2021-03-23 | End: 2021-03-24 | Stop reason: HOSPADM

## 2021-03-23 RX ORDER — TAMSULOSIN HYDROCHLORIDE 0.4 MG/1
0.8 CAPSULE ORAL DAILY
Status: DISCONTINUED | OUTPATIENT
Start: 2021-03-23 | End: 2021-03-24 | Stop reason: HOSPADM

## 2021-03-23 RX ADMIN — Medication 2000 MG: at 02:33

## 2021-03-23 RX ADMIN — HEPARIN 300 UNITS: 100 SYRINGE at 20:21

## 2021-03-23 RX ADMIN — SODIUM CHLORIDE, PRESERVATIVE FREE 10 ML: 5 INJECTION INTRAVENOUS at 17:35

## 2021-03-23 RX ADMIN — TAMSULOSIN HYDROCHLORIDE 0.8 MG: 0.4 CAPSULE ORAL at 10:59

## 2021-03-23 RX ADMIN — SODIUM CHLORIDE 450 MG: 9 INJECTION, SOLUTION INTRAVENOUS at 16:47

## 2021-03-23 RX ADMIN — CETIRIZINE HYDROCHLORIDE 5 MG: 10 TABLET, FILM COATED ORAL at 10:59

## 2021-03-23 RX ADMIN — HYDROCODONE BITARTRATE AND ACETAMINOPHEN 1 TABLET: 5; 325 TABLET ORAL at 22:34

## 2021-03-23 RX ADMIN — ATORVASTATIN CALCIUM 40 MG: 40 TABLET, FILM COATED ORAL at 10:59

## 2021-03-23 RX ADMIN — Medication 10 ML: at 20:22

## 2021-03-23 RX ADMIN — Medication 10 ML: at 11:00

## 2021-03-23 RX ADMIN — CLINDAMYCIN PHOSPHATE 600 MG: 600 INJECTION, SOLUTION INTRAVENOUS at 02:33

## 2021-03-23 RX ADMIN — MONTELUKAST SODIUM 10 MG: 10 TABLET ORAL at 20:21

## 2021-03-23 RX ADMIN — SODIUM CHLORIDE, PRESERVATIVE FREE 10 ML: 5 INJECTION INTRAVENOUS at 16:47

## 2021-03-23 ASSESSMENT — PAIN DESCRIPTION - FREQUENCY: FREQUENCY: INTERMITTENT

## 2021-03-23 ASSESSMENT — PAIN SCALES - GENERAL
PAINLEVEL_OUTOF10: 0
PAINLEVEL_OUTOF10: 6

## 2021-03-23 ASSESSMENT — ENCOUNTER SYMPTOMS
SHORTNESS OF BREATH: 0
ABDOMINAL PAIN: 0

## 2021-03-23 ASSESSMENT — PAIN DESCRIPTION - ONSET: ONSET: SUDDEN

## 2021-03-23 ASSESSMENT — PAIN - FUNCTIONAL ASSESSMENT: PAIN_FUNCTIONAL_ASSESSMENT: PREVENTS OR INTERFERES SOME ACTIVE ACTIVITIES AND ADLS

## 2021-03-23 ASSESSMENT — PAIN DESCRIPTION - PAIN TYPE: TYPE: ACUTE PAIN

## 2021-03-23 ASSESSMENT — PAIN DESCRIPTION - DESCRIPTORS: DESCRIPTORS: ACHING;DISCOMFORT

## 2021-03-23 NOTE — PROGRESS NOTES
Progress Note  Date:3/23/2021       [id number]  Patient Fred Gordon     Date of Birth:     Age:51 y.o. Patient says he fees decent today. Subjective    Subjective:  Symptoms:  Improved. No shortness of breath or chest pain. Diet:  Adequate intake. Activity level: Impaired due to pain. Pain:  He complains of pain that is mild. Review of Systems   Constitutional: Negative for activity change and fever. HENT: Negative for congestion. Respiratory: Negative for shortness of breath. Cardiovascular: Negative for chest pain. Gastrointestinal: Negative for abdominal pain. Skin: Positive for rash. Objective         Vitals Last 24 Hours:  TEMPERATURE:  Temp  Av °F (36.1 °C)  Min: 95.9 °F (35.5 °C)  Max: 97.6 °F (36.4 °C)  RESPIRATIONS RANGE: Resp  Av.7  Min: 17  Max: 18  PULSE OXIMETRY RANGE: SpO2  Av.7 %  Min: 95 %  Max: 98 %  PULSE RANGE: Pulse  Av  Min: 89  Max: 91  BLOOD PRESSURE RANGE: Systolic (98MEC), IXN:968 , Min:118 , HNX:587   ; Diastolic (89NIQ), RNH:13, Min:71, Max:88    I/O (24Hr): Intake/Output Summary (Last 24 hours) at 3/23/2021 0647  Last data filed at 3/22/2021 1949  Gross per 24 hour   Intake 850 ml   Output 0 ml   Net 850 ml     Objective:  General Appearance:  Comfortable. Vital signs: (most recent): Blood pressure 139/88, pulse 90, temperature 95.9 °F (35.5 °C), temperature source Infrared, resp. rate 17, height 5' 10\" (1.778 m), weight (!) 317 lb (143.8 kg), SpO2 95 %. No fever. Lungs:  Normal effort and normal respiratory rate. Breath sounds clear to auscultation. Heart: Normal rate. Regular rhythm.   S1 normal and S2 normal.    Skin:  (LLE erythema improved)    Labs/Imaging/Diagnostics    Labs:  CBC:  Recent Labs     21  0800 21  0508 21  0446   WBC 12.0* 11.1 14.2*   RBC 4.85 4.34 4.80   HGB 14.2 12.7 14.2   HCT 42.4 37.7 42.5   MCV 87.4 86.9 88.5   RDW 12.5 12.4 12.6    189 234 CHEMISTRIES:  Recent Labs     03/21/21  0800 03/22/21  0508 03/23/21  0446    137 136   K 3.6 3.5 3.6   CL 95* 98 96*   CO2 25 31* 31*   BUN 13 12 13   CREATININE 0.9 0.9 0.8   GLUCOSE 111* 107* 109*     PT/INR:No results for input(s): PROTIME, INR in the last 72 hours. APTT:No results for input(s): APTT in the last 72 hours. LIVER PROFILE:No results for input(s): AST, ALT, BILIDIR, BILITOT, ALKPHOS in the last 72 hours. Imaging Last 24 Hours:  Ct Tibia Fibula Left W Wo Contrast    Result Date: 3/22/2021  EXAMINATION: CT OF THE LEFT TIBIA AND FIBULA WITH AND WITHOUT CONTRAST 3/21/2021 10:59 am TECHNIQUE: CT of the left tibia and fibula was performed with and without the administration of intravenous contrast.  Multiplanar reformatted images are provided for review. Dose modulation, iterative reconstruction, and/or weight based adjustment of the mA/kV was utilized to reduce the radiation dose to as low as reasonably achievable. COMPARISON: None HISTORY ORDERING SYSTEM PROVIDED HISTORY: r/o gas, large wound and cellulitis of left lower extremity TECHNOLOGIST PROVIDED HISTORY: Reason for exam:->r/o gas, large wound and cellulitis of left lower extremity Decision Support Exception->Emergency Medical Condition (MA) What reading provider will be dictating this exam?->CRC FINDINGS: No soft tissue gas is seen. Generalized subcutaneous edema is seen about the visualized left lower extremity. No well-defined peripherally enhancing fluid collection is seen to suggest a drainable abscess. No acute fracture or evidence of dislocation. No obvious bony erosions are seen. Ossicle at the tip of the medial malleolus compatible with sequela of prior injury. There is an intra-articular body in the knee joint measuring 10 x 8 mm. Ossicle dorsal to the navicular compatible with sequela of prior injury.   Small osseous prominence along the posterior cortex of the tibial shaft favored to represent a soleal tug lesion, benign. Small Baker's cyst.     No evidence of soft tissue gas or a drainable abscess. Generalized subcutaneous edema about the visualized left lower extremity compatible with cellulitis. No acute bony abnormality is seen. Us Dup Lower Extremity Left Marcelo    Result Date: 3/21/2021  Patient MRN:  [id number] : 1970 Age: 46 years Gender: Male Order Date:  3/21/2021 8:19 AM EXAM: US DUP LOWER EXTREMITY LEFT MARCELO NUMBER OF IMAGES:  33 INDICATION:  dvt dvt What reading provider will be dictating this exam?->[facility] COMPARISON: None Within the visualized vessels, there is no evidence for deep venous thrombosis There is good compressibility, there is good augmentation, there is good color flow. Within the visualized vessels there is no evidence for deep venous thrombosis     Assessment//Plan           Hospital Problems           Last Modified POA    Cellulitis 3/21/2021 Yes        Assessment:  (Cellulitis 3   Asthma     Plan  Ancef and clinda  ID following  Follow cultures  Monitor labs and exam.  No DVT. ).        Electronically signed by Juanpablo Desir MD on 3/23/21 at 6:47 AM EDT

## 2021-03-23 NOTE — PROGRESS NOTES
Infectious Disease  Progress Note  [facility]    Chief Complaint: left leg red    Subjective: left leg cellulitis    Scheduled Meds:   atorvastatin  40 mg Oral Daily    cetirizine  5 mg Oral Daily    montelukast  10 mg Oral Nightly    tamsulosin  0.8 mg Oral Daily    daptomycin (CUBICIN) IVPB  6 mg/kg (Ideal) Intravenous Q24H    lidocaine PF  5 mL Intradermal Once    heparin flush  3 mL Intravenous 2 times per day    sodium chloride flush  10 mL Intravenous BID    enoxaparin  40 mg Subcutaneous Daily     Continuous Infusions:  PRN Meds:albuterol, sodium chloride flush, heparin flush, sodium chloride flush, HYDROcodone 5 mg - acetaminophen    Patient Vitals for the past 24 hrs:   BP Temp Temp src Pulse Resp SpO2   03/23/21 0800 123/74 97.5 °F (36.4 °C) Temporal 82 18 98 %   03/22/21 2315 139/88 95.9 °F (35.5 °C) Infrared 90 17 95 %   03/22/21 1550 128/88 97.6 °F (36.4 °C) Temporal 91 18 98 %       CBC with Differential:    Lab Results   Component Value Date    WBC 14.2 03/23/2021    RBC 4.80 03/23/2021    HGB 14.2 03/23/2021    HCT 42.5 03/23/2021     03/23/2021    MCV 88.5 03/23/2021    MCH 29.6 03/23/2021    MCHC 33.4 03/23/2021    RDW 12.6 03/23/2021    SEGSPCT 65 10/12/2011    LYMPHOPCT 8.8 03/21/2021    MONOPCT 10.8 03/21/2021    BASOPCT 0.2 03/21/2021    MONOSABS 1.29 03/21/2021    LYMPHSABS 1.05 03/21/2021    EOSABS 0.01 03/21/2021    BASOSABS 0.02 03/21/2021     CMP:    Lab Results   Component Value Date     03/23/2021    K 3.6 03/23/2021    K 3.6 03/21/2021    CL 96 03/23/2021    CO2 31 03/23/2021    BUN 13 03/23/2021    CREATININE 0.8 03/23/2021    GFRAA >60 03/23/2021    LABGLOM >60 03/23/2021    GLUCOSE 109 03/23/2021    GLUCOSE 91 10/12/2011    PROT 6.6 01/29/2016    LABALBU 4.4 01/29/2016    LABALBU 4.5 10/12/2011    CALCIUM 9.0 03/23/2021    BILITOT 0.2 01/29/2016    ALKPHOS 78 01/29/2016    AST 25 01/29/2016    ALT 41 01/29/2016       /74   Pulse 82   Temp 97.5 °F (36.4 °C)

## 2021-03-23 NOTE — PROGRESS NOTES
Power picc  Placement 3/23/2021    Product number: [id number]   Lot Number: [id number]      Ultrasound: yes   Right Cephalic vein:                Upper Arm Circumference: 49cm    Size: 4.5fsl    Exposed Length: 2cm    Internal Length: 43cm   Cut: 10cm   Vein Measurement: 0.6cm  Gabe Fairchild  3/23/2021  3:55 PM

## 2021-03-23 NOTE — CARE COORDINATION
[place]ing to locate [place] [address] in network with the pt's insurance. The following HHC are out of [employer]. [facility], [facility], [facility] Choice. Referral made to Say[facility] from [facility]. Awaiting answer. The [employer] website indicates that [facility] is in network. Will follow.  Lang Norman RN

## 2021-03-24 VITALS
SYSTOLIC BLOOD PRESSURE: 138 MMHG | HEIGHT: 70 IN | WEIGHT: 315 LBS | RESPIRATION RATE: 18 BRPM | BODY MASS INDEX: 45.1 KG/M2 | HEART RATE: 85 BPM | DIASTOLIC BLOOD PRESSURE: 87 MMHG | OXYGEN SATURATION: 94 % | TEMPERATURE: 98.3 F

## 2021-03-24 DIAGNOSIS — L03.116 CELLULITIS OF LEFT FOOT: ICD-10-CM

## 2021-03-24 LAB
ANION GAP SERPL CALCULATED.3IONS-SCNC: 8 MMOL/L (ref 7–16)
BUN BLDV-MCNC: 15 MG/DL (ref 6–20)
CALCIUM SERPL-MCNC: 8.9 MG/DL (ref 8.6–10.2)
CHLORIDE BLD-SCNC: 100 MMOL/L (ref 98–107)
CO2: 31 MMOL/L (ref 22–29)
CREAT SERPL-MCNC: 0.8 MG/DL (ref 0.7–1.2)
GFR AFRICAN AMERICAN: >60
GFR NON-AFRICAN AMERICAN: >60 ML/MIN/1.73
GLUCOSE BLD-MCNC: 103 MG/DL (ref 74–99)
HCT VFR BLD CALC: 42.5 % (ref 37–54)
HEMOGLOBIN: 13.7 G/DL (ref 12.5–16.5)
MCH RBC QN AUTO: 28.7 PG (ref 26–35)
MCHC RBC AUTO-ENTMCNC: 32.2 % (ref 32–34.5)
MCV RBC AUTO: 88.9 FL (ref 80–99.9)
PDW BLD-RTO: 12.6 FL (ref 11.5–15)
PLATELET # BLD: 289 E9/L (ref 130–450)
PMV BLD AUTO: 10.1 FL (ref 7–12)
POTASSIUM SERPL-SCNC: 3.7 MMOL/L (ref 3.5–5)
RBC # BLD: 4.78 E12/L (ref 3.8–5.8)
SODIUM BLD-SCNC: 139 MMOL/L (ref 132–146)
WBC # BLD: 13.1 E9/L (ref 4.5–11.5)

## 2021-03-24 PROCEDURE — 85027 COMPLETE CBC AUTOMATED: CPT

## 2021-03-24 PROCEDURE — 6360000002 HC RX W HCPCS: Performed by: FAMILY MEDICINE

## 2021-03-24 PROCEDURE — 6360000002 HC RX W HCPCS: Performed by: INTERNAL MEDICINE

## 2021-03-24 PROCEDURE — 2580000003 HC RX 258: Performed by: FAMILY MEDICINE

## 2021-03-24 PROCEDURE — 80048 BASIC METABOLIC PNL TOTAL CA: CPT

## 2021-03-24 PROCEDURE — 36415 COLL VENOUS BLD VENIPUNCTURE: CPT

## 2021-03-24 PROCEDURE — 6370000000 HC RX 637 (ALT 250 FOR IP): Performed by: FAMILY MEDICINE

## 2021-03-24 RX ORDER — DAPTOMYCIN 50 MG/ML
450 INJECTION, POWDER, LYOPHILIZED, FOR SOLUTION INTRAVENOUS DAILY
Qty: 189 ML | Refills: 0 | Status: SHIPPED | OUTPATIENT
Start: 2021-03-24 | End: 2021-04-14

## 2021-03-24 RX ORDER — SODIUM CHLORIDE 0.9 % (FLUSH) 0.9 %
10 SYRINGE (ML) INJECTION PRN
Status: CANCELLED | OUTPATIENT
Start: 2021-03-25

## 2021-03-24 RX ADMIN — TAMSULOSIN HYDROCHLORIDE 0.8 MG: 0.4 CAPSULE ORAL at 08:19

## 2021-03-24 RX ADMIN — ATORVASTATIN CALCIUM 40 MG: 40 TABLET, FILM COATED ORAL at 08:19

## 2021-03-24 RX ADMIN — CETIRIZINE HYDROCHLORIDE 5 MG: 10 TABLET, FILM COATED ORAL at 08:19

## 2021-03-24 RX ADMIN — Medication 10 ML: at 08:19

## 2021-03-24 RX ADMIN — ENOXAPARIN SODIUM 40 MG: 40 INJECTION SUBCUTANEOUS at 09:32

## 2021-03-24 RX ADMIN — Medication 1500 MG: at 13:48

## 2021-03-24 RX ADMIN — HEPARIN 300 UNITS: 100 SYRINGE at 08:19

## 2021-03-24 ASSESSMENT — PAIN SCALES - GENERAL: PAINLEVEL_OUTOF10: 2

## 2021-03-24 ASSESSMENT — ENCOUNTER SYMPTOMS
SHORTNESS OF BREATH: 0
ABDOMINAL PAIN: 0

## 2021-03-24 ASSESSMENT — PAIN DESCRIPTION - DESCRIPTORS: DESCRIPTORS: ACHING;DISCOMFORT;SORE

## 2021-03-24 ASSESSMENT — PAIN DESCRIPTION - LOCATION: LOCATION: ARM

## 2021-03-24 ASSESSMENT — PAIN DESCRIPTION - FREQUENCY: FREQUENCY: CONTINUOUS

## 2021-03-24 ASSESSMENT — PAIN DESCRIPTION - ORIENTATION: ORIENTATION: RIGHT

## 2021-03-24 NOTE — PLAN OF CARE
Problem: Falls - Risk of:  Goal: Will remain free from falls  Description: Will remain free from falls  3/24/2021 0108 by Fara Magdaleno  Outcome: Met This Shift

## 2021-03-24 NOTE — PROGRESS NOTES
Progress Note  Date:3/24/2021       Room:[facility]  Patient Katelynn Im     Date of Birth:     Age:51 y.o. Patient says he fees decent today. Subjective    Subjective:  Symptoms:  Improved. No shortness of breath or chest pain. Diet:  Adequate intake. Activity level: Impaired due to pain. Pain:  He complains of pain that is mild. Review of Systems   Constitutional: Negative for activity change and fever. HENT: Negative for congestion. Respiratory: Negative for shortness of breath. Cardiovascular: Negative for chest pain. Gastrointestinal: Negative for abdominal pain. Skin: Positive for rash. Objective         Vitals Last 24 Hours:  TEMPERATURE:  Temp  Av.8 °F (36.6 °C)  Min: 97.5 °F (36.4 °C)  Max: 98.1 °F (36.7 °C)  RESPIRATIONS RANGE: Resp  Av.7  Min: 17  Max: 18  PULSE OXIMETRY RANGE: SpO2  Av.5 %  Min: 97 %  Max: 98 %  PULSE RANGE: Pulse  Av  Min: 82  Max: 91  BLOOD PRESSURE RANGE: Systolic (63EPZ), OGF:822 , Min:123 , DIW:779   ; Diastolic (24PNB), RHJ:05, Min:74, Max:82    I/O (24Hr): Intake/Output Summary (Last 24 hours) at 3/24/2021 0636  Last data filed at 3/23/2021 7069  Gross per 24 hour   Intake 240 ml   Output --   Net 240 ml     Objective:  General Appearance:  Comfortable. Vital signs: (most recent): Blood pressure (!) 142/81, pulse 91, temperature 98.1 °F (36.7 °C), temperature source Infrared, resp. rate 17, height 5' 10\" (1.778 m), weight (!) 317 lb (143.8 kg), SpO2 97 %. No fever. Lungs:  Normal effort and normal respiratory rate. Breath sounds clear to auscultation. Heart: Normal rate. Regular rhythm.   S1 normal and S2 normal.    Skin:  (LLE erythema improved)    Labs/Imaging/Diagnostics    Labs:  CBC:  Recent Labs     21  0508 21  0446 21  0520   WBC 11.1 14.2* 13.1*   RBC 4.34 4.80 4.78   HGB 12.7 14.2 13.7   HCT 37.7 42.5 42.5   MCV 86.9 88.5 88.9   RDW 12.4 12.6 12.6    234 289 CHEMISTRIES:  Recent Labs     03/21/21  0800 03/22/21  0508 03/23/21  0446    137 136   K 3.6 3.5 3.6   CL 95* 98 96*   CO2 25 31* 31*   BUN 13 12 13   CREATININE 0.9 0.9 0.8   GLUCOSE 111* 107* 109*     PT/INR:No results for input(s): PROTIME, INR in the last 72 hours. APTT:No results for input(s): APTT in the last 72 hours. LIVER PROFILE:No results for input(s): AST, ALT, BILIDIR, BILITOT, ALKPHOS in the last 72 hours. Imaging Last 24 Hours:  Ct Tibia Fibula Left W Wo Contrast    Result Date: 3/22/2021  EXAMINATION: CT OF THE LEFT TIBIA AND FIBULA WITH AND WITHOUT CONTRAST 3/21/2021 10:59 am TECHNIQUE: CT of the left tibia and fibula was performed with and without the administration of intravenous contrast.  Multiplanar reformatted images are provided for review. Dose modulation, iterative reconstruction, and/or weight based adjustment of the mA/kV was utilized to reduce the radiation dose to as low as reasonably achievable. COMPARISON: None HISTORY ORDERING SYSTEM PROVIDED HISTORY: r/o gas, large wound and cellulitis of left lower extremity TECHNOLOGIST PROVIDED HISTORY: Reason for exam:->r/o gas, large wound and cellulitis of left lower extremity Decision Support Exception->Emergency Medical Condition (MA) What reading provider will be dictating this exam?->[facility] FINDINGS: No soft tissue gas is seen. Generalized subcutaneous edema is seen about the visualized left lower extremity. No well-defined peripherally enhancing fluid collection is seen to suggest a drainable abscess. No acute fracture or evidence of dislocation. No obvious bony erosions are seen. Ossicle at the tip of the medial malleolus compatible with sequela of prior injury. There is an intra-articular body in the knee joint measuring 10 x 8 mm. Ossicle dorsal to the navicular compatible with sequela of prior injury.   Small osseous prominence along the posterior cortex of the tibial shaft favored to represent a soleal tug lesion, benign. Small Baker's cyst.     No evidence of soft tissue gas or a drainable abscess. Generalized subcutaneous edema about the visualized left lower extremity compatible with cellulitis. No acute bony abnormality is seen. Us Dup Lower Extremity Left Marcelo    Result Date: 3/21/2021  Patient MRN:  [id number] : 1970 Age: 46 years Gender: Male Order Date:  3/21/2021 8:19 AM EXAM: US DUP LOWER EXTREMITY LEFT MARCELO NUMBER OF IMAGES:  33 INDICATION:  dvt dvt What reading provider will be dictating this exam?->[facility] COMPARISON: None Within the visualized vessels, there is no evidence for deep venous thrombosis There is good compressibility, there is good augmentation, there is good color flow. Within the visualized vessels there is no evidence for deep venous thrombosis     Assessment//Plan           Hospital Problems           Last Modified POA    Cellulitis 3/21/2021 Yes        Assessment:  (Cellulitis 3   Asthma     Plan  IV dapto per ID. PICC in place. WBC down slightly from yesterday. Home when ok with ID.).

## 2021-03-24 NOTE — CARE COORDINATION
Discharge plan is home with [address] [place] [address] Street for IVs. Received call from [facility] care with pricing. The pt's out-of-pocket expense is $938.28 per week. Dr Sarina Hooks paged through office staff to notify. Dr Sarina Hooks notified of the above.  Lang Norman RN

## 2021-03-24 NOTE — PROGRESS NOTES
Pharmacy Consultation Note  (Antibiotic Dosing and Monitoring)    Initial consult date: 3/24/21  Consulting physician: Dr. Amrik Hyde  Drug(s): Vancomycin  Indication: SSTI      Age/  Gender Height Weight IBW Dosing weight  Allergy Information   51 y.o./male 5' 10\" (177.8 cm) (!) 317 lb (143.8 kg)     Ideal body weight: 73 kg (160 lb 15 oz)  Adjusted ideal body weight: 101.3 kg (223 lb 5.8 oz)  101.3kg  Neosporin plus max st and Other          Other anti-infectives Start date Stop date                         Date  Tmax WBC BUN/CR UOP CrCL  (mL/min) Drug/Dose Time   Given Level(s)   (Time) Comments   3/24 afebrile 13.1  15/0.8 -- > 120 Vancomycin 1500mg q8h <1300>                                               Intake/Output Summary (Last 24 hours) at 3/24/2021 1040  Last data filed at 3/24/2021 0858  Gross per 24 hour   Intake 480 ml   Output --   Net 480 ml       Average urine output:    Cultures:    Site Date Result   Blood 3/21 24 hour NGTD               No results for input(s): Raymundo Haste in the last 72 hours. Historical Cultures:  No results found for: ORG  No results for input(s): BC in the last 72 hours.     Radiology:      Assessment:  · Consulted by Dr. Amrik Hyde to dose/monitor vancomycin  · Goal trough = 15 - 20 mcg/ml  · Scr 0.8    Plan:  · Start vancomycin 1500mg q8h  · A vanco trough level will be obtained when steady-state is reached  · Pharmacist will follow and monitor/adjust dosing as necessary    Kvng Suarez, PharmD, BCPS 3/24/2021 10:40 AM

## 2021-03-24 NOTE — CARE COORDINATION
The vancomycin will cost the pt $1075 per week. This will be payable upon delivery. The pt has elected to go to the [facility] with the daptomycin. Dr Rodolfo Lassiter aware. Prescription faxed to [phone]. The pt has been scheduled for 12:30 Pm tomorrow 3/24. Written and verbal instructions given to the pt and his mother, who is at the bedside. They expressed understanding. Per Dr Rodolfo Lassiter, the pt had IV vancomycin today. He does not need to receive the daptomycin today.  Dany Montilla RN

## 2021-03-24 NOTE — PROGRESS NOTES
Infectious Disease  Progress Note  [facility]    Chief Complaint: left leg red    Subjective: left leg cellulitis    Scheduled Meds:   vancomycin  1,500 mg Intravenous Q8H    atorvastatin  40 mg Oral Daily    cetirizine  5 mg Oral Daily    montelukast  10 mg Oral Nightly    tamsulosin  0.8 mg Oral Daily    lidocaine PF  5 mL Intradermal Once    heparin flush  3 mL Intravenous 2 times per day    sodium chloride flush  10 mL Intravenous BID    enoxaparin  40 mg Subcutaneous Daily     Continuous Infusions:  PRN Meds:albuterol, sodium chloride flush, heparin flush, sodium chloride flush, HYDROcodone 5 mg - acetaminophen    Patient Vitals for the past 24 hrs:   BP Temp Temp src Pulse Resp SpO2   03/24/21 0730 138/87 98.3 °F (36.8 °C) Temporal 85 18 94 %   03/24/21 0000 (!) 142/81 98.1 °F (36.7 °C) Infrared 91 17 --   03/23/21 1630 128/82 97.8 °F (36.6 °C) Temporal 88 18 97 %       CBC with Differential:    Lab Results   Component Value Date    WBC 13.1 03/24/2021    RBC 4.78 03/24/2021    HGB 13.7 03/24/2021    HCT 42.5 03/24/2021     03/24/2021    MCV 88.9 03/24/2021    MCH 28.7 03/24/2021    MCHC 32.2 03/24/2021    RDW 12.6 03/24/2021    SEGSPCT 65 10/12/2011    LYMPHOPCT 8.8 03/21/2021    MONOPCT 10.8 03/21/2021    BASOPCT 0.2 03/21/2021    MONOSABS 1.29 03/21/2021    LYMPHSABS 1.05 03/21/2021    EOSABS 0.01 03/21/2021    BASOSABS 0.02 03/21/2021     CMP:    Lab Results   Component Value Date     03/24/2021    K 3.7 03/24/2021    K 3.6 03/21/2021     03/24/2021    CO2 31 03/24/2021    BUN 15 03/24/2021    CREATININE 0.8 03/24/2021    GFRAA >60 03/24/2021    LABGLOM >60 03/24/2021    GLUCOSE 103 03/24/2021    GLUCOSE 91 10/12/2011    PROT 6.6 01/29/2016    LABALBU 4.4 01/29/2016    LABALBU 4.5 10/12/2011    CALCIUM 8.9 03/24/2021    BILITOT 0.2 01/29/2016    ALKPHOS 78 01/29/2016    AST 25 01/29/2016    ALT 41 01/29/2016       /87   Pulse 85   Temp 98.3 °F (36.8 °C) (Temporal)   Resp 18 Ht 5' 10\" (1.778 m)   Wt (!) 317 lb (143.8 kg)   SpO2 94%   BMI 45.48 kg/m²     Physical Exam  Const/Neuro- unchanged, no signs of acute distress, Alert  ENMT- Within Normal Limits, Normocephalic, mucous membranes pink/moist, No thrush  Neck: Neck supple  Heart- Regular, Rate, Rhythm- no murmur appreciated. Lungs- clear to ascultation. Respirations even and nonlabored. Abdomen- Soft, bowel sounds positive, non tender  Musculo/Extremities-  Equal and symmetrical, no edema. No tenderness. Skin:  Warm and dry, free from rashes. Cultures reviewed    Radiology reviewed  CT TIBIA FIBULA LEFT W WO CONTRAST   Final Result   No evidence of soft tissue gas or a drainable abscess. Generalized   subcutaneous edema about the visualized left lower extremity compatible with   cellulitis. No acute bony abnormality is seen.          US DUP LOWER EXTREMITY LEFT MARCELO   Final Result   Within the visualized vessels there is no evidence for deep venous   thrombosis                   Assessment  LLE cellulitis   WBC up  Insurance will not pay for dapto  WBC 13,100   tmax 99.8  Aso titer not elevated       Plan  Change to iv vanco   PICC line   Talked to    Now back on dapto   2:30pm      Electronically signed by Jay Ventura MD on 3/24/2021 at 12:25 PM

## 2021-03-24 NOTE — DISCHARGE INSTR - COC
Continuity of Care Form    Patient Name: Lisa Qiu   :  1970  MRN:  [id number]    Admit date:  3/21/2021  Discharge date: 3/24/21      Code Status Order: Full Code   Advance Directives:   [facility] Documentation     Date/Time Healthcare Directive Type of Healthcare Directive Copy in [facility] [address] Agent's Name Healthcare Agent's Phone Number    21 1646  No, patient does not have an advance directive for healthcare treatment -- -- -- -- --          Admitting Physician:  Qasim Lopez MD  PCP: Cherrie Arguelles MD    Discharging Nurse: [facility] Unit/Room#: 5402/5402-B  Discharging Unit Phone Number: ***    Emergency Contact:   Extended Emergency Contact Information  Primary Emergency Contact: sobia eastman  Home Phone: [phone]  Relation: Parent  Secondary Emergency Contact: None,Per Pt  Relation: Other    Past Surgical History:  History reviewed. No pertinent surgical history. Immunization History: There is no immunization history on file for this patient.     Active Problems:  Patient Active Problem List   Diagnosis Code    Cellulitis L03.90    Cellulitis of left foot L03.116       Isolation/Infection:   Isolation          No Isolation        Patient Infection Status     None to display          Nurse Assessment:  Last Vital Signs: /87   Pulse 85   Temp 98.3 °F (36.8 °C) (Temporal)   Resp 18   Ht 5' 10\" (1.778 m)   Wt (!) 317 lb (143.8 kg)   SpO2 94%   BMI 45.48 kg/m²     Last documented pain score (0-10 scale): Pain Level: 2  Last Weight:   Wt Readings from Last 1 Encounters:   21 (!) 317 lb (143.8 kg)     Mental Status:  oriented and alert    IV Access:  - PICC - site  R Cephalic, insertion date: 2021    Nursing Mobility/ADLs:  Walking   Independent  Transfer  Independent  Bathing  Independent  Dressing  Independent  [address]  Med Labs or Other Treatments After Discharge: ***    Physician Certification: I certify the above information and transfer of Yury Maloney  is necessary for the continuing treatment of the diagnosis listed and that he requires {Admit to Appropriate Level of Care:98045} for {GREATER/LESS:460379038} 30 days.      Update Admission H&P: {CHP DME Changes in IWQRF:[id number]}    PHYSICIAN SIGNATURE:  {Esignature:[id number]}

## 2021-03-25 ENCOUNTER — HOSPITAL ENCOUNTER (OUTPATIENT)
Dept: INFUSION THERAPY | Age: 51
Setting detail: INFUSION SERIES
Discharge: HOME | End: 2021-03-25
Payer: COMMERCIAL

## 2021-03-25 VITALS
RESPIRATION RATE: 18 BRPM | DIASTOLIC BLOOD PRESSURE: 67 MMHG | HEART RATE: 77 BPM | SYSTOLIC BLOOD PRESSURE: 120 MMHG | TEMPERATURE: 98.6 F

## 2021-03-25 DIAGNOSIS — L03.116 CELLULITIS OF LEFT FOOT: Primary | ICD-10-CM

## 2021-03-25 DIAGNOSIS — L03.116 CELLULITIS OF LEFT LOWER EXTREMITY: ICD-10-CM

## 2021-03-25 LAB
ALBUMIN SERPL-MCNC: 3.4 G/DL (ref 3.5–5.2)
ALP BLD-CCNC: 93 U/L (ref 40–129)
ALT SERPL-CCNC: 113 U/L (ref 0–40)
AST SERPL-CCNC: 61 U/L (ref 0–39)
BASOPHILS ABSOLUTE: 0.06 E9/L (ref 0–0.2)
BASOPHILS RELATIVE PERCENT: 0.5 % (ref 0–2)
BILIRUB SERPL-MCNC: 0.5 MG/DL (ref 0–1.2)
BILIRUBIN DIRECT: <0.2 MG/DL (ref 0–0.3)
BILIRUBIN, INDIRECT: ABNORMAL MG/DL (ref 0–1)
BUN BLDV-MCNC: 15 MG/DL (ref 6–20)
CREAT SERPL-MCNC: 0.8 MG/DL (ref 0.7–1.2)
EOSINOPHILS ABSOLUTE: 0.5 E9/L (ref 0.05–0.5)
EOSINOPHILS RELATIVE PERCENT: 3.8 % (ref 0–6)
GFR AFRICAN AMERICAN: >60
GFR NON-AFRICAN AMERICAN: >60 ML/MIN/1.73
HCT VFR BLD CALC: 41.4 % (ref 37–54)
HEMOGLOBIN: 13.5 G/DL (ref 12.5–16.5)
IMMATURE GRANULOCYTES #: 0.56 E9/L
IMMATURE GRANULOCYTES %: 4.2 % (ref 0–5)
LYMPHOCYTES ABSOLUTE: 2.59 E9/L (ref 1.5–4)
LYMPHOCYTES RELATIVE PERCENT: 19.6 % (ref 20–42)
MCH RBC QN AUTO: 28.8 PG (ref 26–35)
MCHC RBC AUTO-ENTMCNC: 32.6 % (ref 32–34.5)
MCV RBC AUTO: 88.3 FL (ref 80–99.9)
MONOCYTES ABSOLUTE: 0.91 E9/L (ref 0.1–0.95)
MONOCYTES RELATIVE PERCENT: 6.9 % (ref 2–12)
NEUTROPHILS ABSOLUTE: 8.57 E9/L (ref 1.8–7.3)
NEUTROPHILS RELATIVE PERCENT: 65 % (ref 43–80)
PDW BLD-RTO: 12.6 FL (ref 11.5–15)
PLATELET # BLD: 331 E9/L (ref 130–450)
PMV BLD AUTO: 9.8 FL (ref 7–12)
POLYCHROMASIA: ABNORMAL
RBC # BLD: 4.69 E12/L (ref 3.8–5.8)
TOTAL PROTEIN: 6.9 G/DL (ref 6.4–8.3)
WBC # BLD: 13.2 E9/L (ref 4.5–11.5)

## 2021-03-25 PROCEDURE — 2580000003 HC RX 258: Performed by: INTERNAL MEDICINE

## 2021-03-25 PROCEDURE — 84520 ASSAY OF UREA NITROGEN: CPT

## 2021-03-25 PROCEDURE — 96365 THER/PROPH/DIAG IV INF INIT: CPT

## 2021-03-25 PROCEDURE — 6360000002 HC RX W HCPCS

## 2021-03-25 PROCEDURE — 80076 HEPATIC FUNCTION PANEL: CPT

## 2021-03-25 PROCEDURE — 36415 COLL VENOUS BLD VENIPUNCTURE: CPT

## 2021-03-25 PROCEDURE — 82565 ASSAY OF CREATININE: CPT

## 2021-03-25 PROCEDURE — 36592 COLLECT BLOOD FROM PICC: CPT

## 2021-03-25 PROCEDURE — 85025 COMPLETE CBC W/AUTO DIFF WBC: CPT

## 2021-03-25 PROCEDURE — 6360000002 HC RX W HCPCS: Performed by: INTERNAL MEDICINE

## 2021-03-25 RX ORDER — SODIUM CHLORIDE 0.9 % (FLUSH) 0.9 %
10 SYRINGE (ML) INJECTION PRN
Status: CANCELLED | OUTPATIENT
Start: 2021-03-26

## 2021-03-25 RX ORDER — HEPARIN SODIUM (PORCINE) LOCK FLUSH IV SOLN 100 UNIT/ML 100 UNIT/ML
SOLUTION INTRAVENOUS
Status: COMPLETED
Start: 2021-03-25 | End: 2021-03-25

## 2021-03-25 RX ORDER — SODIUM CHLORIDE 0.9 % (FLUSH) 0.9 %
10 SYRINGE (ML) INJECTION PRN
Status: DISCONTINUED | OUTPATIENT
Start: 2021-03-25 | End: 2021-03-26 | Stop reason: HOSPADM

## 2021-03-25 RX ADMIN — DAPTOMYCIN 450 MG: 500 INJECTION, POWDER, LYOPHILIZED, FOR SOLUTION INTRAVENOUS at 13:13

## 2021-03-25 RX ADMIN — HEPARIN 500 UNITS: 100 SYRINGE at 13:55

## 2021-03-25 RX ADMIN — SODIUM CHLORIDE, PRESERVATIVE FREE 10 ML: 5 INJECTION INTRAVENOUS at 13:55

## 2021-03-25 ASSESSMENT — PAIN DESCRIPTION - LOCATION: LOCATION: LEG

## 2021-03-25 ASSESSMENT — ENCOUNTER SYMPTOMS
SHORTNESS OF BREATH: 0
ABDOMINAL PAIN: 0
VOMITING: 0
NAUSEA: 0
EYE REDNESS: 0
COLOR CHANGE: 1

## 2021-03-25 ASSESSMENT — PAIN DESCRIPTION - FREQUENCY: FREQUENCY: INTERMITTENT

## 2021-03-25 ASSESSMENT — PAIN SCALES - GENERAL: PAINLEVEL_OUTOF10: 5

## 2021-03-25 ASSESSMENT — PAIN DESCRIPTION - ORIENTATION: ORIENTATION: LEFT

## 2021-03-25 ASSESSMENT — PAIN DESCRIPTION - PAIN TYPE: TYPE: ACUTE PAIN

## 2021-03-25 ASSESSMENT — PAIN DESCRIPTION - DESCRIPTORS: DESCRIPTORS: ACHING;DISCOMFORT;SHARP

## 2021-03-25 NOTE — DISCHARGE SUMMARY
Discharge Summary    Date: 3/25/2021  Patient Name: Keisha Can YOB: 1970 Age: 46 y.o. Admit Date: 3/21/2021  Discharge Date: 3/24/2021  Discharge Condition: Stable    Admission Diagnosis  Cellulitis (L03.90)     Discharge Diagnosis  Active Problems: CellulitisResolved Problems: * No resolved hospital problems. [facility] Stay  Narrative of Hospital Course:  Patient admitted for lower ext,. Cellulitis. Improved with IV Abx. ID sent home with picc for daptomycin. Consultants:  [facility] CONSULT TO INFECTIOUS DISEASESPHARMACY TO DOSE VANCOMYCIN    Surgeries/procedures Performed:       Treatments:           Discharge Plan/Disposition:  Home    Hospital/Incidental Findings Requiring Follow Up:    Patient Instructions:    Diet: Cardiac Diet    Activity:Activity as Tolerated  For number of days (if applicable): Other Instructions:    Provider Follow-Up:   No follow-ups on file.      Significant Diagnostic Studies:    Recent Labs:  Admission on 03/21/2021, Discharged on 03/24/2021WBC                                         Date: 03/21/2021Value: 12.0*       Ref range: 4.5 - 11.5 E9/L    Status: FinalRBC                                           Date: 03/21/2021Value: 4.85        Ref range: 3.80 - 5.80 E12/L  Status: FinalHemoglobin                                    Date: 03/21/2021Value: 14.2        Ref range: 12.5 - 16.5 g/dL   Status: FinalHematocrit                                    Date: 03/21/2021Value: 42.4        Ref range: 37.0 - 54.0 %      Status: FinalMCV                                           Date: 03/21/2021Value: 87.4        Ref range: 80.0 - 99.9 fL     Status: [address]                                           Date: 03/21/2021Value: 29.3        Ref range: 26.0 - 35.0 pg     Status: [address]                                          Date: 03/21/2021Value: 33.5        Ref range: 32.0 - 34.5 %      Status: FinalRDW reflex Magnesium                    Date: 03/21/2021Value: 3.6         Ref range: 3.5 - 5.0 mmol/L   Status: FinalChloride                                      Date: 03/21/2021Value: 95*         Ref range: 98 - 107 mmol/L    Status: FinalCO2                                           Date: 03/21/2021Value: 25          Ref range: 22 - 29 mmol/L     Status: FinalAnion Gap                                     Date: 03/21/2021Value: 12          Ref range: 7 - 16 mmol/L      Status: FinalGlucose                                       Date: 03/21/2021Value: 111*        Ref range: 74 - 99 mg/dL      Status: FinalBUN                                           Date: 03/21/2021Value: 13          Ref range: 6 - 20 mg/dL       Status: FinalCREATININE                                    Date: 03/21/2021Value: 0.9         Ref range: 0.7 - 1.2 mg/dL    Status: FinalGFR Non-                      Date: 03/21/2021Value: >60         Ref range: >=60 mL/min/1.73   Status: Final              Comment: Chronic Kidney Disease: less than 60 ml/min/1.73 sq.m. Kidney Failure: less than 15 ml/min/1.73 sq. m. Results valid for patients 18 years and older. GFR                           Date: 03/21/2021Value: >60           Status: FinalCalcium                                       Date: 03/21/2021Value: 9.0         Ref range: 8.6 - 10.2 mg/dL   Status: FinalLactic Acid, Sepsis                           Date: 03/21/2021Value: 1.4         Ref range: 0.5 - 1.9 mmol/L   Status: FinalBlood Culture, Routine                        Date: 03/21/2021Value: 24 Hours no growth                     Status: PreliminaryCulture, Blood 2                              Date: 03/21/2021Value: 24 Hours no growth                     Status: PreliminarySed Rate                                      Date: 03/21/2021Value: 70*         Ref range: 0 - 15 mm/Hr       Status: FinalCRP                                           Date: 03/21/2021Value: 18.1*       Ref range: 0.0 - 0.4 mg/dL    Status: [address] [facility]                                           Date: 03/22/2021Value: 11.1        Ref range: 4.5 - 11.5 E9/L    Status: FinalRBC                                           Date: 03/22/2021Value: 4.34        Ref range: 3.80 - 5.80 E12/L  Status: FinalHemoglobin                                    Date: 03/22/2021Value: 12.7        Ref range: 12.5 - 16.5 g/dL   Status: FinalHematocrit                                    Date: 03/22/2021Value: 37.7        Ref range: 37.0 - 54.0 %      Status: FinalMCV                                           Date: 03/22/2021Value: 86.9        Ref range: 80.0 - 99.9 fL     Status: [address]                                           Date: 03/22/2021Value: 29.3        Ref range: 26.0 - 35.0 pg     Status: [address]                                          Date: 03/22/2021Value: 33.7        Ref range: 32.0 - 34.5 %      Status: FinalRDW                                           Date: 03/22/2021Value: 12.4        Ref range: 11.5 - 15.0 fL     Status: FinalPlatelets                                     Date: 03/22/2021Value: 189         Ref range: 130 - 450 E9/L     Status: FinalMPV                                           Date: 03/22/2021Value: 10.1        Ref range: 7.0 - 12.0 fL      Status: FinalSodium                                        Date: 03/22/2021Value: 137         Ref range: 132 - 146 mmol/L   Status: FinalPotassium                                     Date: 03/22/2021Value: 3.5         Ref range: 3.5 - 5.0 mmol/L   Status: FinalChloride                                      Date: 03/22/2021Value: 98          Ref range: 98 - 107 mmol/L    Status: FinalCO2                                           Date: 03/22/2021Value: 31*         Ref range: 22 - 29 mmol/L     Status: FinalAnion Gap                                     Date: 03/22/2021Value: 8           Ref range: 7 - 16 mmol/L      Status: FinalGlucose Date: 03/22/2021Value: 107*        Ref range: 74 - 99 mg/dL      Status: FinalBUN                                           Date: 03/22/2021Value: 12          Ref range: 6 - 20 mg/dL       Status: FinalCREATININE                                    Date: 03/22/2021Value: 0.9         Ref range: 0.7 - 1.2 mg/dL    Status: FinalGFR Non-                      Date: 03/22/2021Value: >60         Ref range: >=60 mL/min/1.73   Status: Final              Comment: Chronic Kidney Disease: less than 60 ml/min/1.73 sq.m. Kidney Failure: less than 15 ml/min/1.73 sq. m. Results valid for patients 18 years and older. GFR                           Date: 03/22/2021Value: >60           Status: FinalCalcium                                       Date: 03/22/2021Value: 8.7         Ref range: 8.6 - 10.2 mg/dL   Status: FinalASO                                           Date: 03/22/2021Value: 99          Ref range: 0 - 200 IU/mL      Status: Final              Comment: EXPECTED ASO VALUESNormal adults:  <200 IU/mLPediatrics:     <150 IU/mLWBC                                           Date: 03/23/2021Value: 14.2*       Ref range: 4.5 - 11.5 E9/L    Status: FinalRBC                                           Date: 03/23/2021Value: 4.80        Ref range: 3.80 - 5.80 E12/L  Status: FinalHemoglobin                                    Date: 03/23/2021Value: 14.2        Ref range: 12.5 - 16.5 g/dL   Status: FinalHematocrit                                    Date: 03/23/2021Value: 42.5        Ref range: 37.0 - 54.0 %      Status: FinalMCV                                           Date: 03/23/2021Value: 88.5        Ref range: 80.0 - 99.9 fL     Status: [address] [place]                                           Date: 03/23/2021Value: 29.6        Ref range: 26.0 - 35.0 pg     Status: [address]                                          Date: 03/23/2021Value: 33.4        Ref range: 32.0 - 34.5 %      Status: FinalRDW Date: 03/23/2021Value: 12.6        Ref range: 11.5 - 15.0 fL     Status: FinalPlatelets                                     Date: 03/23/2021Value: 234         Ref range: 130 - 450 E9/L     Status: FinalMPV                                           Date: 03/23/2021Value: 10.1        Ref range: 7.0 - 12.0 fL      Status: FinalSodium                                        Date: 03/23/2021Value: 136         Ref range: 132 - 146 mmol/L   Status: FinalPotassium                                     Date: 03/23/2021Value: 3.6         Ref range: 3.5 - 5.0 mmol/L   Status: FinalChloride                                      Date: 03/23/2021Value: 96*         Ref range: 98 - 107 mmol/L    Status: FinalCO2                                           Date: 03/23/2021Value: 31*         Ref range: 22 - 29 mmol/L     Status: FinalAnion Gap                                     Date: 03/23/2021Value: 9           Ref range: 7 - 16 mmol/L      Status: FinalGlucose                                       Date: 03/23/2021Value: 109*        Ref range: 74 - 99 mg/dL      Status: FinalBUN                                           Date: 03/23/2021Value: 13          Ref range: 6 - 20 mg/dL       Status: FinalCREATININE                                    Date: 03/23/2021Value: 0.8         Ref range: 0.7 - 1.2 mg/dL    Status: FinalGFR Non-                      Date: 03/23/2021Value: >60         Ref range: >=60 mL/min/1.73   Status: Final              Comment: Chronic Kidney Disease: less than 60 ml/min/1.73 sq.m. Kidney Failure: less than 15 ml/min/1.73 sq. m. Results valid for patients 18 years and older. GFR                           Date: 03/23/2021Value: >60           Status: FinalCalcium                                       Date: 03/23/2021Value: 9.0         Ref range: 8.6 - 10.2 mg/dL   Status: [address] [facility]                                           Date: 03/24/2021Value: 13.1*       Ref range: 4.5 - 11.5 E9/L    Status: FinalRBC                                           Date: 03/24/2021Value: 4.78        Ref range: 3.80 - 5.80 E12/L  Status: FinalHemoglobin                                    Date: 03/24/2021Value: 13.7        Ref range: 12.5 - 16.5 g/dL   Status: FinalHematocrit                                    Date: 03/24/2021Value: 42.5        Ref range: 37.0 - 54.0 %      Status: FinalMCV                                           Date: 03/24/2021Value: 88.9        Ref range: 80.0 - 99.9 fL     Status: [address] [place]                                           Date: 03/24/2021Value: 28.7        Ref range: 26.0 - 35.0 pg     Status: [address]                                          Date: 03/24/2021Value: 32.2        Ref range: 32.0 - 34.5 %      Status: FinalRDW                                           Date: 03/24/2021Value: 12.6        Ref range: 11.5 - 15.0 fL     Status: FinalPlatelets                                     Date: 03/24/2021Value: 289         Ref range: 130 - 450 E9/L     Status: FinalMPV                                           Date: 03/24/2021Value: 10.1        Ref range: 7.0 - 12.0 fL      Status: FinalSodium                                        Date: 03/24/2021Value: 139         Ref range: 132 - 146 mmol/L   Status: FinalPotassium                                     Date: 03/24/2021Value: 3.7         Ref range: 3.5 - 5.0 mmol/L   Status: FinalChloride                                      Date: 03/24/2021Value: 100         Ref range: 98 - 107 mmol/L    Status: FinalCO2                                           Date: 03/24/2021Value: 31*         Ref range: 22 - 29 mmol/L     Status: FinalAnion Gap                                     Date: 03/24/2021Value: 8           Ref range: 7 - 16 mmol/L      Status: FinalGlucose                                       Date: 03/24/2021Value: 103*        Ref range: 74 - 99 mg/dL      Status: [place] Date: 03/24/2021Value: 15          Ref range: 6 - 20 mg/dL       Status: FinalCREATININE                                    Date: 03/24/2021Value: 0.8         Ref range: 0.7 - 1.2 mg/dL    Status: FinalGFR Non-                      Date: 03/24/2021Value: >60         Ref range: >=60 mL/min/1.73   Status: Final              Comment: Chronic Kidney Disease: less than 60 ml/min/1.73 sq.m. Kidney Failure: less than 15 ml/min/1.73 sq. m. Results valid for patients 18 years and older. GFR                           Date: 03/24/2021Value: >60           Status: FinalCalcium                                       Date: 03/24/2021Value: 8.9         Ref range: 8.6 - 10.2 mg/dL   Status: Final------------    Radiology last 7 days:  Ct Tibia Fibula Left W Wo ContrastResult Date: 3/22/2021No evidence of soft tissue gas or a drainable abscess. Generalized subcutaneous edema about the visualized left lower extremity compatible with cellulitis. No acute bony abnormality is seen.  Us Dup Lower Extremity Left VenResult Date: 3/21/2021Within the visualized vessels there is no evidence for deep venous thrombosis      Pending Labs   Order Current Status  Culture, Blood 1 Preliminary result  Culture, Blood 2 Preliminary result      Discharge Medications    Discharge Medication List as of 3/24/2021  4:17 PMSTART taking these medicationsvancomycin (VANCOCIN) infusionInfuse 1,500 mg intravenously every 8 hours for 21 days Compound per protocol., Disp-29589 mg, R-0PrintDAPTOmycin (CUBICIN) 500 MG injectionInfuse 9 mLs intravenously daily for 21 days, Disp-189 mL, R-0Print    Discharge Medication List as of 3/24/2021  4:17 PM    Discharge Medication List as of 3/24/2021  4:17 PMCONTINUE these medications which have NOT CHANGEDcetirizine (ZYRTEC) 5 MG tabletTake 5 mg by mouth dailyHistorical Medmontelukast (SINGULAIR) 10 MG tabletTake 10 mg by mouth nightlyHistorical Medtamsulosin (FLOMAX) 0.4 MG capsuleTake 0.8 mg by mouth dailyHistorical Medatorvastatin (LIPITOR) 40 MG tabletTake 40 mg by mouth dailyHistorical Medalbuterol (ACCUNEB) 0.63 MG/3ML nebulizer solutionTake 1 ampule by nebulization every 6 hours as needed for WheezingHistorical Medsulfamethoxazole-trimethoprim (BACTRIM DS;SEPTRA DS) 800-160 MG per tabletTake 1 tablet by mouth 2 times dailyHistorical Med    Discharge Medication List as of 3/24/2021  4:17 PM    Time Spent on Discharge:1E] minutes were spent in patient examination, evaluation, counseling as well as medication reconciliation, prescriptions for required medications, discharge plan, and follow up.     Electronically signed by Jose Aguilera MD on 3/25/21 at 6:26 AM EDT

## 2021-03-26 ENCOUNTER — HOSPITAL ENCOUNTER (OUTPATIENT)
Dept: INFUSION THERAPY | Age: 51
Setting detail: INFUSION SERIES
Discharge: HOME | End: 2021-03-26
Payer: COMMERCIAL

## 2021-03-26 VITALS
RESPIRATION RATE: 18 BRPM | HEART RATE: 71 BPM | DIASTOLIC BLOOD PRESSURE: 79 MMHG | TEMPERATURE: 98.1 F | SYSTOLIC BLOOD PRESSURE: 136 MMHG

## 2021-03-26 DIAGNOSIS — L03.116 CELLULITIS OF LEFT LOWER EXTREMITY: ICD-10-CM

## 2021-03-26 DIAGNOSIS — L03.116 CELLULITIS OF LEFT FOOT: Primary | ICD-10-CM

## 2021-03-26 LAB
BLOOD CULTURE, ROUTINE: NORMAL
CULTURE, BLOOD 2: NORMAL

## 2021-03-26 PROCEDURE — 96365 THER/PROPH/DIAG IV INF INIT: CPT

## 2021-03-26 PROCEDURE — 6360000002 HC RX W HCPCS: Performed by: INTERNAL MEDICINE

## 2021-03-26 PROCEDURE — 6360000002 HC RX W HCPCS: Performed by: SPECIALIST

## 2021-03-26 PROCEDURE — 2580000003 HC RX 258: Performed by: INTERNAL MEDICINE

## 2021-03-26 RX ORDER — SODIUM CHLORIDE 0.9 % (FLUSH) 0.9 %
10 SYRINGE (ML) INJECTION PRN
Status: CANCELLED | OUTPATIENT
Start: 2021-03-27

## 2021-03-26 RX ORDER — SODIUM CHLORIDE 0.9 % (FLUSH) 0.9 %
10 SYRINGE (ML) INJECTION PRN
Status: DISCONTINUED | OUTPATIENT
Start: 2021-03-26 | End: 2021-03-27 | Stop reason: HOSPADM

## 2021-03-26 RX ORDER — HEPARIN SODIUM (PORCINE) LOCK FLUSH IV SOLN 100 UNIT/ML 100 UNIT/ML
300 SOLUTION INTRAVENOUS PRN
Status: CANCELLED | OUTPATIENT
Start: 2021-03-27

## 2021-03-26 RX ORDER — HEPARIN SODIUM (PORCINE) LOCK FLUSH IV SOLN 100 UNIT/ML 100 UNIT/ML
300 SOLUTION INTRAVENOUS PRN
Status: DISCONTINUED | OUTPATIENT
Start: 2021-03-26 | End: 2021-03-27 | Stop reason: HOSPADM

## 2021-03-26 RX ADMIN — SODIUM CHLORIDE, PRESERVATIVE FREE 10 ML: 5 INJECTION INTRAVENOUS at 12:55

## 2021-03-26 RX ADMIN — HEPARIN 300 UNITS: 100 SYRINGE at 13:29

## 2021-03-26 RX ADMIN — SODIUM CHLORIDE, PRESERVATIVE FREE 10 ML: 5 INJECTION INTRAVENOUS at 13:29

## 2021-03-26 RX ADMIN — DAPTOMYCIN 450 MG: 500 INJECTION, POWDER, LYOPHILIZED, FOR SOLUTION INTRAVENOUS at 12:55

## 2021-03-26 ASSESSMENT — PAIN DESCRIPTION - PAIN TYPE: TYPE: ACUTE PAIN

## 2021-03-26 ASSESSMENT — PAIN DESCRIPTION - FREQUENCY: FREQUENCY: INTERMITTENT

## 2021-03-26 ASSESSMENT — PAIN DESCRIPTION - DESCRIPTORS: DESCRIPTORS: ACHING

## 2021-03-26 ASSESSMENT — PAIN DESCRIPTION - LOCATION: LOCATION: LEG

## 2021-03-26 ASSESSMENT — PAIN SCALES - GENERAL: PAINLEVEL_OUTOF10: 5

## 2021-03-26 ASSESSMENT — PAIN DESCRIPTION - ORIENTATION: ORIENTATION: LEFT

## 2021-03-27 ENCOUNTER — HOSPITAL ENCOUNTER (OUTPATIENT)
Dept: INFUSION THERAPY | Age: 51
Setting detail: INFUSION SERIES
Discharge: HOME | End: 2021-03-27
Payer: COMMERCIAL

## 2021-03-27 VITALS
HEART RATE: 86 BPM | DIASTOLIC BLOOD PRESSURE: 85 MMHG | OXYGEN SATURATION: 97 % | TEMPERATURE: 97.1 F | SYSTOLIC BLOOD PRESSURE: 134 MMHG | RESPIRATION RATE: 20 BRPM

## 2021-03-27 DIAGNOSIS — L03.116 CELLULITIS OF LEFT LOWER EXTREMITY: ICD-10-CM

## 2021-03-27 DIAGNOSIS — L03.116 CELLULITIS OF LEFT FOOT: Primary | ICD-10-CM

## 2021-03-27 PROCEDURE — 2580000003 HC RX 258: Performed by: INTERNAL MEDICINE

## 2021-03-27 PROCEDURE — 6360000002 HC RX W HCPCS: Performed by: SPECIALIST

## 2021-03-27 PROCEDURE — 96365 THER/PROPH/DIAG IV INF INIT: CPT

## 2021-03-27 PROCEDURE — 6360000002 HC RX W HCPCS: Performed by: INTERNAL MEDICINE

## 2021-03-27 RX ORDER — HEPARIN SODIUM (PORCINE) LOCK FLUSH IV SOLN 100 UNIT/ML 100 UNIT/ML
300 SOLUTION INTRAVENOUS PRN
Status: CANCELLED | OUTPATIENT
Start: 2021-03-28

## 2021-03-27 RX ORDER — SODIUM CHLORIDE 0.9 % (FLUSH) 0.9 %
10 SYRINGE (ML) INJECTION PRN
Status: DISCONTINUED | OUTPATIENT
Start: 2021-03-27 | End: 2021-03-28 | Stop reason: HOSPADM

## 2021-03-27 RX ORDER — HEPARIN SODIUM (PORCINE) LOCK FLUSH IV SOLN 100 UNIT/ML 100 UNIT/ML
300 SOLUTION INTRAVENOUS PRN
Status: DISCONTINUED | OUTPATIENT
Start: 2021-03-27 | End: 2021-03-28 | Stop reason: HOSPADM

## 2021-03-27 RX ORDER — SODIUM CHLORIDE 0.9 % (FLUSH) 0.9 %
10 SYRINGE (ML) INJECTION PRN
Status: CANCELLED | OUTPATIENT
Start: 2021-03-28

## 2021-03-27 RX ADMIN — SODIUM CHLORIDE, PRESERVATIVE FREE 10 ML: 5 INJECTION INTRAVENOUS at 08:58

## 2021-03-27 RX ADMIN — HEPARIN 300 UNITS: 100 SYRINGE at 09:32

## 2021-03-27 RX ADMIN — SODIUM CHLORIDE 450 MG: 9 INJECTION, SOLUTION INTRAVENOUS at 08:58

## 2021-03-27 RX ADMIN — SODIUM CHLORIDE, PRESERVATIVE FREE 10 ML: 5 INJECTION INTRAVENOUS at 09:32

## 2021-03-27 ASSESSMENT — PAIN SCALES - GENERAL: PAINLEVEL_OUTOF10: 4

## 2021-03-27 ASSESSMENT — PAIN DESCRIPTION - ORIENTATION: ORIENTATION: LEFT

## 2021-03-27 ASSESSMENT — PAIN DESCRIPTION - PAIN TYPE: TYPE: ACUTE PAIN

## 2021-03-27 ASSESSMENT — PAIN DESCRIPTION - ONSET: ONSET: ON-GOING

## 2021-03-27 ASSESSMENT — PAIN DESCRIPTION - DESCRIPTORS: DESCRIPTORS: ACHING;CONSTANT;DISCOMFORT

## 2021-03-27 ASSESSMENT — PAIN DESCRIPTION - FREQUENCY: FREQUENCY: INTERMITTENT

## 2021-03-27 ASSESSMENT — PAIN DESCRIPTION - PROGRESSION: CLINICAL_PROGRESSION: NOT CHANGED

## 2021-03-27 ASSESSMENT — PAIN DESCRIPTION - LOCATION: LOCATION: LEG

## 2021-03-27 ASSESSMENT — PAIN - FUNCTIONAL ASSESSMENT: PAIN_FUNCTIONAL_ASSESSMENT: PREVENTS OR INTERFERES SOME ACTIVE ACTIVITIES AND ADLS

## 2021-03-28 ENCOUNTER — HOSPITAL ENCOUNTER (OUTPATIENT)
Dept: INFUSION THERAPY | Age: 51
Setting detail: INFUSION SERIES
Discharge: HOME | End: 2021-03-28
Payer: COMMERCIAL

## 2021-03-28 VITALS
OXYGEN SATURATION: 97 % | DIASTOLIC BLOOD PRESSURE: 69 MMHG | TEMPERATURE: 97.6 F | RESPIRATION RATE: 20 BRPM | SYSTOLIC BLOOD PRESSURE: 153 MMHG | HEART RATE: 86 BPM

## 2021-03-28 DIAGNOSIS — L03.116 CELLULITIS OF LEFT FOOT: Primary | ICD-10-CM

## 2021-03-28 DIAGNOSIS — L03.116 CELLULITIS OF LEFT LOWER EXTREMITY: ICD-10-CM

## 2021-03-28 PROCEDURE — 2580000003 HC RX 258: Performed by: INTERNAL MEDICINE

## 2021-03-28 PROCEDURE — 6360000002 HC RX W HCPCS: Performed by: INTERNAL MEDICINE

## 2021-03-28 PROCEDURE — 6360000002 HC RX W HCPCS: Performed by: SPECIALIST

## 2021-03-28 PROCEDURE — 96365 THER/PROPH/DIAG IV INF INIT: CPT

## 2021-03-28 RX ORDER — SODIUM CHLORIDE 0.9 % (FLUSH) 0.9 %
10 SYRINGE (ML) INJECTION PRN
Status: DISCONTINUED | OUTPATIENT
Start: 2021-03-28 | End: 2021-03-29 | Stop reason: HOSPADM

## 2021-03-28 RX ORDER — HEPARIN SODIUM (PORCINE) LOCK FLUSH IV SOLN 100 UNIT/ML 100 UNIT/ML
300 SOLUTION INTRAVENOUS PRN
Status: DISCONTINUED | OUTPATIENT
Start: 2021-03-28 | End: 2021-03-29 | Stop reason: HOSPADM

## 2021-03-28 RX ORDER — HEPARIN SODIUM (PORCINE) LOCK FLUSH IV SOLN 100 UNIT/ML 100 UNIT/ML
300 SOLUTION INTRAVENOUS PRN
Status: CANCELLED | OUTPATIENT
Start: 2021-03-29

## 2021-03-28 RX ORDER — SODIUM CHLORIDE 0.9 % (FLUSH) 0.9 %
10 SYRINGE (ML) INJECTION PRN
Status: CANCELLED | OUTPATIENT
Start: 2021-03-29

## 2021-03-28 RX ADMIN — SODIUM CHLORIDE, PRESERVATIVE FREE 10 ML: 5 INJECTION INTRAVENOUS at 09:46

## 2021-03-28 RX ADMIN — SODIUM CHLORIDE 450 MG: 9 INJECTION, SOLUTION INTRAVENOUS at 09:08

## 2021-03-28 RX ADMIN — HEPARIN 300 UNITS: 100 SYRINGE at 09:46

## 2021-03-28 RX ADMIN — SODIUM CHLORIDE, PRESERVATIVE FREE 10 ML: 5 INJECTION INTRAVENOUS at 09:08

## 2021-03-28 ASSESSMENT — PAIN DESCRIPTION - PROGRESSION: CLINICAL_PROGRESSION: NOT CHANGED

## 2021-03-28 ASSESSMENT — PAIN DESCRIPTION - FREQUENCY: FREQUENCY: INTERMITTENT

## 2021-03-28 ASSESSMENT — PAIN DESCRIPTION - ORIENTATION: ORIENTATION: LEFT

## 2021-03-28 ASSESSMENT — PAIN DESCRIPTION - PAIN TYPE: TYPE: ACUTE PAIN

## 2021-03-28 ASSESSMENT — PAIN DESCRIPTION - DESCRIPTORS: DESCRIPTORS: ACHING;CONSTANT;DISCOMFORT

## 2021-03-28 ASSESSMENT — PAIN - FUNCTIONAL ASSESSMENT: PAIN_FUNCTIONAL_ASSESSMENT: PREVENTS OR INTERFERES SOME ACTIVE ACTIVITIES AND ADLS

## 2021-03-28 ASSESSMENT — PAIN DESCRIPTION - ONSET: ONSET: ON-GOING

## 2021-03-28 ASSESSMENT — PAIN SCALES - GENERAL: PAINLEVEL_OUTOF10: 3

## 2021-03-28 ASSESSMENT — PAIN DESCRIPTION - LOCATION: LOCATION: LEG

## 2021-03-29 ENCOUNTER — HOSPITAL ENCOUNTER (OUTPATIENT)
Dept: INFUSION THERAPY | Age: 51
Setting detail: INFUSION SERIES
Discharge: HOME | End: 2021-03-29
Payer: COMMERCIAL

## 2021-03-29 VITALS
HEART RATE: 87 BPM | TEMPERATURE: 98.4 F | DIASTOLIC BLOOD PRESSURE: 75 MMHG | SYSTOLIC BLOOD PRESSURE: 124 MMHG | RESPIRATION RATE: 20 BRPM

## 2021-03-29 DIAGNOSIS — L03.116 CELLULITIS OF LEFT LOWER EXTREMITY: ICD-10-CM

## 2021-03-29 DIAGNOSIS — L03.116 CELLULITIS OF LEFT FOOT: Primary | ICD-10-CM

## 2021-03-29 LAB
ALBUMIN SERPL-MCNC: 3.8 G/DL (ref 3.5–5.2)
ALP BLD-CCNC: 97 U/L (ref 40–129)
ALT SERPL-CCNC: 85 U/L (ref 0–40)
AST SERPL-CCNC: 40 U/L (ref 0–39)
BASOPHILS ABSOLUTE: 0.05 E9/L (ref 0–0.2)
BASOPHILS RELATIVE PERCENT: 0.4 % (ref 0–2)
BILIRUB SERPL-MCNC: 0.4 MG/DL (ref 0–1.2)
BILIRUBIN DIRECT: <0.2 MG/DL (ref 0–0.3)
BILIRUBIN, INDIRECT: ABNORMAL MG/DL (ref 0–1)
BUN BLDV-MCNC: 14 MG/DL (ref 6–20)
C-REACTIVE PROTEIN: 0.7 MG/DL (ref 0–0.4)
CREAT SERPL-MCNC: 0.8 MG/DL (ref 0.7–1.2)
EOSINOPHILS ABSOLUTE: 0.35 E9/L (ref 0.05–0.5)
EOSINOPHILS RELATIVE PERCENT: 2.7 % (ref 0–6)
GFR AFRICAN AMERICAN: >60
GFR NON-AFRICAN AMERICAN: >60 ML/MIN/1.73
HCT VFR BLD CALC: 41.4 % (ref 37–54)
HEMOGLOBIN: 13.4 G/DL (ref 12.5–16.5)
IMMATURE GRANULOCYTES #: 0.31 E9/L
IMMATURE GRANULOCYTES %: 2.4 % (ref 0–5)
LYMPHOCYTES ABSOLUTE: 3.09 E9/L (ref 1.5–4)
LYMPHOCYTES RELATIVE PERCENT: 24.1 % (ref 20–42)
MCH RBC QN AUTO: 28.8 PG (ref 26–35)
MCHC RBC AUTO-ENTMCNC: 32.4 % (ref 32–34.5)
MCV RBC AUTO: 89 FL (ref 80–99.9)
MONOCYTES ABSOLUTE: 0.76 E9/L (ref 0.1–0.95)
MONOCYTES RELATIVE PERCENT: 5.9 % (ref 2–12)
NEUTROPHILS ABSOLUTE: 8.25 E9/L (ref 1.8–7.3)
NEUTROPHILS RELATIVE PERCENT: 64.5 % (ref 43–80)
PDW BLD-RTO: 12.3 FL (ref 11.5–15)
PLATELET # BLD: 375 E9/L (ref 130–450)
PMV BLD AUTO: 9.5 FL (ref 7–12)
RBC # BLD: 4.65 E12/L (ref 3.8–5.8)
SEDIMENTATION RATE, ERYTHROCYTE: 50 MM/HR (ref 0–15)
TOTAL PROTEIN: 7.1 G/DL (ref 6.4–8.3)
WBC # BLD: 12.8 E9/L (ref 4.5–11.5)

## 2021-03-29 PROCEDURE — 85025 COMPLETE CBC W/AUTO DIFF WBC: CPT

## 2021-03-29 PROCEDURE — 80076 HEPATIC FUNCTION PANEL: CPT

## 2021-03-29 PROCEDURE — 86140 C-REACTIVE PROTEIN: CPT

## 2021-03-29 PROCEDURE — 6360000002 HC RX W HCPCS: Performed by: SPECIALIST

## 2021-03-29 PROCEDURE — 96365 THER/PROPH/DIAG IV INF INIT: CPT

## 2021-03-29 PROCEDURE — 84520 ASSAY OF UREA NITROGEN: CPT

## 2021-03-29 PROCEDURE — 36415 COLL VENOUS BLD VENIPUNCTURE: CPT

## 2021-03-29 PROCEDURE — 6360000002 HC RX W HCPCS: Performed by: INTERNAL MEDICINE

## 2021-03-29 PROCEDURE — 85651 RBC SED RATE NONAUTOMATED: CPT

## 2021-03-29 PROCEDURE — 99211 OFF/OP EST MAY X REQ PHY/QHP: CPT

## 2021-03-29 PROCEDURE — 82565 ASSAY OF CREATININE: CPT

## 2021-03-29 PROCEDURE — 2580000003 HC RX 258: Performed by: INTERNAL MEDICINE

## 2021-03-29 RX ORDER — SODIUM CHLORIDE 0.9 % (FLUSH) 0.9 %
10 SYRINGE (ML) INJECTION PRN
Status: CANCELLED | OUTPATIENT
Start: 2021-03-30

## 2021-03-29 RX ORDER — SODIUM CHLORIDE 0.9 % (FLUSH) 0.9 %
10 SYRINGE (ML) INJECTION PRN
Status: DISCONTINUED | OUTPATIENT
Start: 2021-03-29 | End: 2021-03-30 | Stop reason: HOSPADM

## 2021-03-29 RX ORDER — HEPARIN SODIUM (PORCINE) LOCK FLUSH IV SOLN 100 UNIT/ML 100 UNIT/ML
300 SOLUTION INTRAVENOUS PRN
Status: CANCELLED | OUTPATIENT
Start: 2021-03-30

## 2021-03-29 RX ORDER — HEPARIN SODIUM (PORCINE) LOCK FLUSH IV SOLN 100 UNIT/ML 100 UNIT/ML
300 SOLUTION INTRAVENOUS PRN
Status: DISCONTINUED | OUTPATIENT
Start: 2021-03-29 | End: 2021-03-30 | Stop reason: HOSPADM

## 2021-03-29 RX ADMIN — HEPARIN 300 UNITS: 100 SYRINGE at 13:55

## 2021-03-29 RX ADMIN — SODIUM CHLORIDE, PRESERVATIVE FREE 10 ML: 5 INJECTION INTRAVENOUS at 13:10

## 2021-03-29 RX ADMIN — HEPARIN 300 UNITS: 100 SYRINGE at 13:11

## 2021-03-29 RX ADMIN — SODIUM CHLORIDE, PRESERVATIVE FREE 10 ML: 5 INJECTION INTRAVENOUS at 13:55

## 2021-03-29 RX ADMIN — DAPTOMYCIN 450 MG: 500 INJECTION, POWDER, LYOPHILIZED, FOR SOLUTION INTRAVENOUS at 13:04

## 2021-03-29 ASSESSMENT — PAIN - FUNCTIONAL ASSESSMENT: PAIN_FUNCTIONAL_ASSESSMENT: PREVENTS OR INTERFERES SOME ACTIVE ACTIVITIES AND ADLS

## 2021-03-29 ASSESSMENT — PAIN DESCRIPTION - LOCATION: LOCATION: LEG

## 2021-03-29 ASSESSMENT — PAIN DESCRIPTION - ORIENTATION: ORIENTATION: LEFT

## 2021-03-29 ASSESSMENT — PAIN DESCRIPTION - ONSET: ONSET: ON-GOING

## 2021-03-29 ASSESSMENT — PAIN DESCRIPTION - FREQUENCY: FREQUENCY: INTERMITTENT

## 2021-03-29 ASSESSMENT — PAIN DESCRIPTION - PROGRESSION: CLINICAL_PROGRESSION: GRADUALLY IMPROVING

## 2021-03-29 ASSESSMENT — PAIN DESCRIPTION - PAIN TYPE: TYPE: ACUTE PAIN

## 2021-03-29 ASSESSMENT — PAIN SCALES - GENERAL: PAINLEVEL_OUTOF10: 3

## 2021-03-29 ASSESSMENT — PAIN DESCRIPTION - DESCRIPTORS: DESCRIPTORS: ACHING;CONSTANT;DISCOMFORT

## 2021-03-30 ENCOUNTER — HOSPITAL ENCOUNTER (OUTPATIENT)
Dept: INFUSION THERAPY | Age: 51
Setting detail: INFUSION SERIES
Discharge: HOME | End: 2021-03-30
Payer: COMMERCIAL

## 2021-03-30 VITALS
RESPIRATION RATE: 20 BRPM | TEMPERATURE: 97.8 F | HEART RATE: 89 BPM | SYSTOLIC BLOOD PRESSURE: 109 MMHG | DIASTOLIC BLOOD PRESSURE: 71 MMHG

## 2021-03-30 DIAGNOSIS — L03.116 CELLULITIS OF LEFT FOOT: Primary | ICD-10-CM

## 2021-03-30 DIAGNOSIS — L03.116 CELLULITIS OF LEFT LOWER EXTREMITY: ICD-10-CM

## 2021-03-30 PROCEDURE — 2580000003 HC RX 258: Performed by: INTERNAL MEDICINE

## 2021-03-30 PROCEDURE — 96365 THER/PROPH/DIAG IV INF INIT: CPT

## 2021-03-30 PROCEDURE — 6360000002 HC RX W HCPCS: Performed by: SPECIALIST

## 2021-03-30 PROCEDURE — 6360000002 HC RX W HCPCS: Performed by: INTERNAL MEDICINE

## 2021-03-30 RX ORDER — HEPARIN SODIUM (PORCINE) LOCK FLUSH IV SOLN 100 UNIT/ML 100 UNIT/ML
300 SOLUTION INTRAVENOUS PRN
Status: DISCONTINUED | OUTPATIENT
Start: 2021-03-30 | End: 2021-03-31 | Stop reason: HOSPADM

## 2021-03-30 RX ORDER — HEPARIN SODIUM (PORCINE) LOCK FLUSH IV SOLN 100 UNIT/ML 100 UNIT/ML
300 SOLUTION INTRAVENOUS PRN
Status: CANCELLED | OUTPATIENT
Start: 2021-03-31

## 2021-03-30 RX ORDER — SODIUM CHLORIDE 0.9 % (FLUSH) 0.9 %
10 SYRINGE (ML) INJECTION PRN
Status: CANCELLED | OUTPATIENT
Start: 2021-03-31

## 2021-03-30 RX ORDER — SODIUM CHLORIDE 0.9 % (FLUSH) 0.9 %
10 SYRINGE (ML) INJECTION PRN
Status: DISCONTINUED | OUTPATIENT
Start: 2021-03-30 | End: 2021-03-31 | Stop reason: HOSPADM

## 2021-03-30 RX ADMIN — DAPTOMYCIN 450 MG: 500 INJECTION, POWDER, LYOPHILIZED, FOR SOLUTION INTRAVENOUS at 13:27

## 2021-03-30 RX ADMIN — SODIUM CHLORIDE, PRESERVATIVE FREE 10 ML: 5 INJECTION INTRAVENOUS at 13:27

## 2021-03-30 RX ADMIN — SODIUM CHLORIDE, PRESERVATIVE FREE 10 ML: 5 INJECTION INTRAVENOUS at 14:07

## 2021-03-30 RX ADMIN — HEPARIN 300 UNITS: 100 SYRINGE at 14:07

## 2021-03-30 ASSESSMENT — PAIN DESCRIPTION - DESCRIPTORS: DESCRIPTORS: ACHING;CONSTANT;DISCOMFORT

## 2021-03-30 ASSESSMENT — PAIN SCALES - GENERAL: PAINLEVEL_OUTOF10: 5

## 2021-03-30 ASSESSMENT — PAIN DESCRIPTION - PAIN TYPE: TYPE: ACUTE PAIN

## 2021-03-30 ASSESSMENT — PAIN - FUNCTIONAL ASSESSMENT: PAIN_FUNCTIONAL_ASSESSMENT: PREVENTS OR INTERFERES SOME ACTIVE ACTIVITIES AND ADLS

## 2021-03-30 ASSESSMENT — PAIN DESCRIPTION - PROGRESSION: CLINICAL_PROGRESSION: NOT CHANGED

## 2021-03-30 ASSESSMENT — PAIN DESCRIPTION - FREQUENCY: FREQUENCY: INTERMITTENT

## 2021-03-30 ASSESSMENT — PAIN DESCRIPTION - LOCATION: LOCATION: LEG

## 2021-03-30 ASSESSMENT — PAIN DESCRIPTION - ORIENTATION: ORIENTATION: LEFT

## 2021-03-30 ASSESSMENT — PAIN DESCRIPTION - ONSET: ONSET: ON-GOING

## 2021-03-31 ENCOUNTER — HOSPITAL ENCOUNTER (OUTPATIENT)
Dept: INFUSION THERAPY | Age: 51
Setting detail: INFUSION SERIES
Discharge: HOME | End: 2021-03-31
Payer: COMMERCIAL

## 2021-03-31 DIAGNOSIS — L03.116 CELLULITIS OF LEFT LOWER EXTREMITY: ICD-10-CM

## 2021-03-31 DIAGNOSIS — L03.116 CELLULITIS OF LEFT FOOT: Primary | ICD-10-CM

## 2021-03-31 PROCEDURE — 2580000003 HC RX 258: Performed by: INTERNAL MEDICINE

## 2021-03-31 PROCEDURE — 6360000002 HC RX W HCPCS: Performed by: SPECIALIST

## 2021-03-31 PROCEDURE — 96365 THER/PROPH/DIAG IV INF INIT: CPT

## 2021-03-31 PROCEDURE — 6360000002 HC RX W HCPCS: Performed by: INTERNAL MEDICINE

## 2021-03-31 RX ORDER — SODIUM CHLORIDE 0.9 % (FLUSH) 0.9 %
10 SYRINGE (ML) INJECTION PRN
Status: DISCONTINUED | OUTPATIENT
Start: 2021-03-31 | End: 2021-04-01 | Stop reason: HOSPADM

## 2021-03-31 RX ORDER — SODIUM CHLORIDE 0.9 % (FLUSH) 0.9 %
10 SYRINGE (ML) INJECTION PRN
Status: CANCELLED | OUTPATIENT
Start: 2021-04-01

## 2021-03-31 RX ORDER — HEPARIN SODIUM (PORCINE) LOCK FLUSH IV SOLN 100 UNIT/ML 100 UNIT/ML
300 SOLUTION INTRAVENOUS PRN
Status: CANCELLED | OUTPATIENT
Start: 2021-04-01

## 2021-03-31 RX ORDER — HEPARIN SODIUM (PORCINE) LOCK FLUSH IV SOLN 100 UNIT/ML 100 UNIT/ML
300 SOLUTION INTRAVENOUS PRN
Status: DISCONTINUED | OUTPATIENT
Start: 2021-03-31 | End: 2021-04-01 | Stop reason: HOSPADM

## 2021-03-31 RX ADMIN — SODIUM CHLORIDE, PRESERVATIVE FREE 10 ML: 5 INJECTION INTRAVENOUS at 11:10

## 2021-03-31 RX ADMIN — SODIUM CHLORIDE, PRESERVATIVE FREE 10 ML: 5 INJECTION INTRAVENOUS at 10:39

## 2021-03-31 RX ADMIN — DAPTOMYCIN 450 MG: 500 INJECTION, POWDER, LYOPHILIZED, FOR SOLUTION INTRAVENOUS at 10:39

## 2021-03-31 RX ADMIN — HEPARIN 300 UNITS: 100 SYRINGE at 11:11

## 2021-03-31 ASSESSMENT — PAIN DESCRIPTION - PAIN TYPE: TYPE: ACUTE PAIN

## 2021-03-31 ASSESSMENT — PAIN DESCRIPTION - ORIENTATION: ORIENTATION: LEFT

## 2021-03-31 ASSESSMENT — PAIN SCALES - GENERAL: PAINLEVEL_OUTOF10: 4

## 2021-03-31 ASSESSMENT — PAIN DESCRIPTION - LOCATION: LOCATION: LEG

## 2021-03-31 ASSESSMENT — PAIN DESCRIPTION - DESCRIPTORS: DESCRIPTORS: ACHING;DISCOMFORT

## 2021-03-31 ASSESSMENT — PAIN DESCRIPTION - FREQUENCY: FREQUENCY: INTERMITTENT

## 2021-03-31 ASSESSMENT — PAIN DESCRIPTION - ONSET: ONSET: ON-GOING

## 2021-03-31 ASSESSMENT — PAIN - FUNCTIONAL ASSESSMENT: PAIN_FUNCTIONAL_ASSESSMENT: PREVENTS OR INTERFERES WITH MANY ACTIVE NOT PASSIVE ACTIVITIES

## 2021-03-31 ASSESSMENT — PAIN DESCRIPTION - PROGRESSION: CLINICAL_PROGRESSION: GRADUALLY WORSENING

## 2021-04-01 ENCOUNTER — HOSPITAL ENCOUNTER (OUTPATIENT)
Dept: INFUSION THERAPY | Age: 51
Setting detail: INFUSION SERIES
Discharge: HOME | End: 2021-04-01
Payer: COMMERCIAL

## 2021-04-01 VITALS
TEMPERATURE: 97.3 F | RESPIRATION RATE: 20 BRPM | HEART RATE: 72 BPM | DIASTOLIC BLOOD PRESSURE: 77 MMHG | SYSTOLIC BLOOD PRESSURE: 122 MMHG

## 2021-04-01 DIAGNOSIS — L03.116 CELLULITIS OF LEFT FOOT: Primary | ICD-10-CM

## 2021-04-01 DIAGNOSIS — L03.116 CELLULITIS OF LEFT LOWER EXTREMITY: ICD-10-CM

## 2021-04-01 LAB
ALBUMIN SERPL-MCNC: 3.8 G/DL (ref 3.5–5.2)
ALP BLD-CCNC: 93 U/L (ref 40–129)
ALT SERPL-CCNC: 69 U/L (ref 0–40)
AST SERPL-CCNC: 31 U/L (ref 0–39)
BASOPHILS ABSOLUTE: 0.05 E9/L (ref 0–0.2)
BASOPHILS RELATIVE PERCENT: 0.5 % (ref 0–2)
BILIRUB SERPL-MCNC: 0.4 MG/DL (ref 0–1.2)
BILIRUBIN DIRECT: <0.2 MG/DL (ref 0–0.3)
BILIRUBIN, INDIRECT: ABNORMAL MG/DL (ref 0–1)
BUN BLDV-MCNC: 17 MG/DL (ref 6–20)
CREAT SERPL-MCNC: 0.8 MG/DL (ref 0.7–1.2)
EOSINOPHILS ABSOLUTE: 0.23 E9/L (ref 0.05–0.5)
EOSINOPHILS RELATIVE PERCENT: 2.2 % (ref 0–6)
GFR AFRICAN AMERICAN: >60
GFR NON-AFRICAN AMERICAN: >60 ML/MIN/1.73
HCT VFR BLD CALC: 40.7 % (ref 37–54)
HEMOGLOBIN: 13.2 G/DL (ref 12.5–16.5)
IMMATURE GRANULOCYTES #: 0.06 E9/L
IMMATURE GRANULOCYTES %: 0.6 % (ref 0–5)
LYMPHOCYTES ABSOLUTE: 3.49 E9/L (ref 1.5–4)
LYMPHOCYTES RELATIVE PERCENT: 33 % (ref 20–42)
MCH RBC QN AUTO: 28.8 PG (ref 26–35)
MCHC RBC AUTO-ENTMCNC: 32.4 % (ref 32–34.5)
MCV RBC AUTO: 88.9 FL (ref 80–99.9)
MONOCYTES ABSOLUTE: 0.63 E9/L (ref 0.1–0.95)
MONOCYTES RELATIVE PERCENT: 5.9 % (ref 2–12)
NEUTROPHILS ABSOLUTE: 6.13 E9/L (ref 1.8–7.3)
NEUTROPHILS RELATIVE PERCENT: 57.8 % (ref 43–80)
PDW BLD-RTO: 12.2 FL (ref 11.5–15)
PLATELET # BLD: 316 E9/L (ref 130–450)
PMV BLD AUTO: 9.6 FL (ref 7–12)
RBC # BLD: 4.58 E12/L (ref 3.8–5.8)
TOTAL PROTEIN: 6.8 G/DL (ref 6.4–8.3)
WBC # BLD: 10.6 E9/L (ref 4.5–11.5)

## 2021-04-01 PROCEDURE — 82565 ASSAY OF CREATININE: CPT

## 2021-04-01 PROCEDURE — 96365 THER/PROPH/DIAG IV INF INIT: CPT

## 2021-04-01 PROCEDURE — 36591 DRAW BLOOD OFF VENOUS DEVICE: CPT

## 2021-04-01 PROCEDURE — 2580000003 HC RX 258: Performed by: INTERNAL MEDICINE

## 2021-04-01 PROCEDURE — 85025 COMPLETE CBC W/AUTO DIFF WBC: CPT

## 2021-04-01 PROCEDURE — 80076 HEPATIC FUNCTION PANEL: CPT

## 2021-04-01 PROCEDURE — 6360000002 HC RX W HCPCS: Performed by: INTERNAL MEDICINE

## 2021-04-01 PROCEDURE — 36415 COLL VENOUS BLD VENIPUNCTURE: CPT

## 2021-04-01 PROCEDURE — 6360000002 HC RX W HCPCS: Performed by: SPECIALIST

## 2021-04-01 PROCEDURE — 84520 ASSAY OF UREA NITROGEN: CPT

## 2021-04-01 RX ORDER — SODIUM CHLORIDE 0.9 % (FLUSH) 0.9 %
10 SYRINGE (ML) INJECTION PRN
Status: CANCELLED | OUTPATIENT
Start: 2021-04-02

## 2021-04-01 RX ORDER — SODIUM CHLORIDE 0.9 % (FLUSH) 0.9 %
10 SYRINGE (ML) INJECTION PRN
Status: DISCONTINUED | OUTPATIENT
Start: 2021-04-01 | End: 2021-04-02 | Stop reason: HOSPADM

## 2021-04-01 RX ORDER — HEPARIN SODIUM (PORCINE) LOCK FLUSH IV SOLN 100 UNIT/ML 100 UNIT/ML
300 SOLUTION INTRAVENOUS PRN
Status: CANCELLED | OUTPATIENT
Start: 2021-04-02

## 2021-04-01 RX ORDER — HEPARIN SODIUM (PORCINE) LOCK FLUSH IV SOLN 100 UNIT/ML 100 UNIT/ML
300 SOLUTION INTRAVENOUS PRN
Status: DISCONTINUED | OUTPATIENT
Start: 2021-04-01 | End: 2021-04-02 | Stop reason: HOSPADM

## 2021-04-01 RX ADMIN — SODIUM CHLORIDE, PRESERVATIVE FREE 10 ML: 5 INJECTION INTRAVENOUS at 13:57

## 2021-04-01 RX ADMIN — SODIUM CHLORIDE, PRESERVATIVE FREE 10 ML: 5 INJECTION INTRAVENOUS at 13:26

## 2021-04-01 RX ADMIN — DAPTOMYCIN 450 MG: 500 INJECTION, POWDER, LYOPHILIZED, FOR SOLUTION INTRAVENOUS at 13:26

## 2021-04-01 RX ADMIN — HEPARIN 300 UNITS: 100 SYRINGE at 13:57

## 2021-04-01 RX ADMIN — SODIUM CHLORIDE, PRESERVATIVE FREE 10 ML: 5 INJECTION INTRAVENOUS at 13:20

## 2021-04-01 RX ADMIN — HEPARIN 300 UNITS: 100 SYRINGE at 13:37

## 2021-04-01 ASSESSMENT — PAIN DESCRIPTION - LOCATION: LOCATION: LEG

## 2021-04-01 ASSESSMENT — PAIN SCALES - GENERAL: PAINLEVEL_OUTOF10: 3

## 2021-04-01 ASSESSMENT — PAIN DESCRIPTION - PAIN TYPE: TYPE: ACUTE PAIN

## 2021-04-01 ASSESSMENT — PAIN DESCRIPTION - ORIENTATION: ORIENTATION: LEFT

## 2021-04-01 ASSESSMENT — PAIN DESCRIPTION - DESCRIPTORS: DESCRIPTORS: ACHING;DISCOMFORT

## 2021-04-02 ENCOUNTER — HOSPITAL ENCOUNTER (OUTPATIENT)
Dept: INFUSION THERAPY | Age: 51
Setting detail: INFUSION SERIES
Discharge: HOME | End: 2021-04-02
Payer: COMMERCIAL

## 2021-04-02 ENCOUNTER — HOSPITAL ENCOUNTER (OUTPATIENT)
Dept: GENERAL RADIOLOGY | Age: 51
Discharge: HOME | End: 2021-04-04
Payer: COMMERCIAL

## 2021-04-02 ENCOUNTER — HOSPITAL ENCOUNTER (OUTPATIENT)
Age: 51
Discharge: HOME | End: 2021-04-04
Payer: COMMERCIAL

## 2021-04-02 VITALS
RESPIRATION RATE: 20 BRPM | OXYGEN SATURATION: 100 % | SYSTOLIC BLOOD PRESSURE: 155 MMHG | HEART RATE: 74 BPM | TEMPERATURE: 98.8 F | DIASTOLIC BLOOD PRESSURE: 97 MMHG

## 2021-04-02 DIAGNOSIS — L03.116 CELLULITIS OF LEFT LOWER EXTREMITY: ICD-10-CM

## 2021-04-02 DIAGNOSIS — L03.116 CELLULITIS OF LEFT FOOT: Primary | ICD-10-CM

## 2021-04-02 DIAGNOSIS — Z95.828 S/P PICC CENTRAL LINE PLACEMENT: ICD-10-CM

## 2021-04-02 PROCEDURE — 6360000002 HC RX W HCPCS: Performed by: INTERNAL MEDICINE

## 2021-04-02 PROCEDURE — 99211 OFF/OP EST MAY X REQ PHY/QHP: CPT

## 2021-04-02 PROCEDURE — 6360000002 HC RX W HCPCS: Performed by: SPECIALIST

## 2021-04-02 PROCEDURE — 96365 THER/PROPH/DIAG IV INF INIT: CPT

## 2021-04-02 PROCEDURE — 71045 X-RAY EXAM CHEST 1 VIEW: CPT

## 2021-04-02 PROCEDURE — 2580000003 HC RX 258: Performed by: INTERNAL MEDICINE

## 2021-04-02 RX ORDER — SODIUM CHLORIDE 0.9 % (FLUSH) 0.9 %
10 SYRINGE (ML) INJECTION PRN
Status: CANCELLED | OUTPATIENT
Start: 2021-04-03

## 2021-04-02 RX ORDER — HEPARIN SODIUM (PORCINE) LOCK FLUSH IV SOLN 100 UNIT/ML 100 UNIT/ML
300 SOLUTION INTRAVENOUS PRN
Status: CANCELLED | OUTPATIENT
Start: 2021-04-03

## 2021-04-02 RX ORDER — HEPARIN SODIUM (PORCINE) LOCK FLUSH IV SOLN 100 UNIT/ML 100 UNIT/ML
300 SOLUTION INTRAVENOUS PRN
Status: DISCONTINUED | OUTPATIENT
Start: 2021-04-02 | End: 2021-04-03 | Stop reason: HOSPADM

## 2021-04-02 RX ORDER — SODIUM CHLORIDE 0.9 % (FLUSH) 0.9 %
10 SYRINGE (ML) INJECTION PRN
Status: DISCONTINUED | OUTPATIENT
Start: 2021-04-02 | End: 2021-04-03 | Stop reason: HOSPADM

## 2021-04-02 RX ADMIN — DAPTOMYCIN 450 MG: 500 INJECTION, POWDER, LYOPHILIZED, FOR SOLUTION INTRAVENOUS at 13:31

## 2021-04-02 RX ADMIN — Medication 300 UNITS: at 14:07

## 2021-04-02 RX ADMIN — SODIUM CHLORIDE, PRESERVATIVE FREE 10 ML: 5 INJECTION INTRAVENOUS at 14:06

## 2021-04-02 ASSESSMENT — PAIN DESCRIPTION - FREQUENCY: FREQUENCY: INTERMITTENT

## 2021-04-02 ASSESSMENT — PAIN DESCRIPTION - ORIENTATION: ORIENTATION: LEFT

## 2021-04-02 ASSESSMENT — PAIN DESCRIPTION - ONSET: ONSET: ON-GOING

## 2021-04-02 ASSESSMENT — PAIN - FUNCTIONAL ASSESSMENT: PAIN_FUNCTIONAL_ASSESSMENT: PREVENTS OR INTERFERES SOME ACTIVE ACTIVITIES AND ADLS

## 2021-04-02 ASSESSMENT — PAIN DESCRIPTION - PROGRESSION: CLINICAL_PROGRESSION: GRADUALLY IMPROVING

## 2021-04-02 ASSESSMENT — PAIN SCALES - GENERAL: PAINLEVEL_OUTOF10: 3

## 2021-04-02 ASSESSMENT — PAIN DESCRIPTION - DESCRIPTORS: DESCRIPTORS: ACHING;DISCOMFORT;DULL

## 2021-04-02 ASSESSMENT — PAIN DESCRIPTION - LOCATION: LOCATION: LEG

## 2021-04-02 ASSESSMENT — PAIN DESCRIPTION - PAIN TYPE: TYPE: ACUTE PAIN

## 2021-04-02 NOTE — PROGRESS NOTES
Pt became diaphoretic and states he doesn't feel good. BP,HR and PO are stable. Pt used his inhaler and felt somewhat better and was also belching. Attempted to access picc line and was unable to flush or get a blood return. Pt's dressing also check to make sure there weren't any kinks in it. Drsg changed and was fine. Had pt stand up and got a blood return and flushed easily. Antibiotic hung. When pt would sit down the antibiotic would not infuse but when he stood up it would run. Eventually it infused when he sat down and had a blood return. Pt given ginger ale for his stomach and this did seem to help him. After pt was done with his antibiotic, he sat here for a while then stated he was okay to go home. Pt had visitor here to drive him home.

## 2021-04-02 NOTE — PROGRESS NOTES
IV team was notified of trouble with line per [facility] RN . An xray was recommended. Patient was discharged before xray could be ordered. Call placed to Dr. Khadijah Live office to get order and patient called to return call to us.

## 2021-04-02 NOTE — PROGRESS NOTES
Order received for chest xray. Patient called per Candace Gan Rn to return to hospital now for xray. IV team called to look for chest xray report.

## 2021-04-03 ENCOUNTER — HOSPITAL ENCOUNTER (OUTPATIENT)
Dept: INFUSION THERAPY | Age: 51
Setting detail: INFUSION SERIES
Discharge: HOME | End: 2021-04-03
Payer: COMMERCIAL

## 2021-04-03 VITALS
OXYGEN SATURATION: 99 % | HEART RATE: 80 BPM | DIASTOLIC BLOOD PRESSURE: 63 MMHG | TEMPERATURE: 97.4 F | SYSTOLIC BLOOD PRESSURE: 128 MMHG | RESPIRATION RATE: 18 BRPM

## 2021-04-03 DIAGNOSIS — L03.116 CELLULITIS OF LEFT FOOT: Primary | ICD-10-CM

## 2021-04-03 DIAGNOSIS — L03.116 CELLULITIS OF LEFT LOWER EXTREMITY: ICD-10-CM

## 2021-04-03 PROCEDURE — 99211 OFF/OP EST MAY X REQ PHY/QHP: CPT

## 2021-04-03 PROCEDURE — 6360000002 HC RX W HCPCS: Performed by: SPECIALIST

## 2021-04-03 PROCEDURE — 2580000003 HC RX 258: Performed by: INTERNAL MEDICINE

## 2021-04-03 PROCEDURE — 6360000002 HC RX W HCPCS: Performed by: INTERNAL MEDICINE

## 2021-04-03 PROCEDURE — 96365 THER/PROPH/DIAG IV INF INIT: CPT

## 2021-04-03 RX ORDER — SODIUM CHLORIDE 0.9 % (FLUSH) 0.9 %
10 SYRINGE (ML) INJECTION PRN
Status: DISCONTINUED | OUTPATIENT
Start: 2021-04-03 | End: 2021-04-04 | Stop reason: HOSPADM

## 2021-04-03 RX ORDER — HEPARIN SODIUM (PORCINE) LOCK FLUSH IV SOLN 100 UNIT/ML 100 UNIT/ML
300 SOLUTION INTRAVENOUS PRN
Status: CANCELLED | OUTPATIENT
Start: 2021-04-04

## 2021-04-03 RX ORDER — HEPARIN SODIUM (PORCINE) LOCK FLUSH IV SOLN 100 UNIT/ML 100 UNIT/ML
1 SOLUTION INTRAVENOUS EVERY 12 HOURS SCHEDULED
Status: DISCONTINUED | OUTPATIENT
Start: 2021-04-03 | End: 2021-04-04 | Stop reason: HOSPADM

## 2021-04-03 RX ORDER — HEPARIN SODIUM (PORCINE) LOCK FLUSH IV SOLN 100 UNIT/ML 100 UNIT/ML
300 SOLUTION INTRAVENOUS PRN
Status: DISCONTINUED | OUTPATIENT
Start: 2021-04-03 | End: 2021-04-04 | Stop reason: HOSPADM

## 2021-04-03 RX ORDER — SODIUM CHLORIDE 0.9 % (FLUSH) 0.9 %
10 SYRINGE (ML) INJECTION PRN
Status: CANCELLED | OUTPATIENT
Start: 2021-04-04

## 2021-04-03 RX ORDER — HEPARIN SODIUM (PORCINE) LOCK FLUSH IV SOLN 100 UNIT/ML 100 UNIT/ML
1 SOLUTION INTRAVENOUS PRN
Status: DISCONTINUED | OUTPATIENT
Start: 2021-04-03 | End: 2021-04-04 | Stop reason: HOSPADM

## 2021-04-03 RX ADMIN — SODIUM CHLORIDE, PRESERVATIVE FREE 10 ML: 5 INJECTION INTRAVENOUS at 10:27

## 2021-04-03 RX ADMIN — SODIUM CHLORIDE 450 MG: 9 INJECTION, SOLUTION INTRAVENOUS at 10:26

## 2021-04-03 RX ADMIN — SODIUM CHLORIDE, PRESERVATIVE FREE 10 ML: 5 INJECTION INTRAVENOUS at 11:01

## 2021-04-03 RX ADMIN — Medication 300 UNITS: at 11:01

## 2021-04-03 ASSESSMENT — PAIN DESCRIPTION - ONSET: ONSET: ON-GOING

## 2021-04-03 ASSESSMENT — PAIN DESCRIPTION - DESCRIPTORS: DESCRIPTORS: ACHING;CONSTANT;DISCOMFORT

## 2021-04-03 ASSESSMENT — PAIN - FUNCTIONAL ASSESSMENT: PAIN_FUNCTIONAL_ASSESSMENT: PREVENTS OR INTERFERES SOME ACTIVE ACTIVITIES AND ADLS

## 2021-04-03 ASSESSMENT — PAIN DESCRIPTION - PROGRESSION: CLINICAL_PROGRESSION: NOT CHANGED

## 2021-04-03 ASSESSMENT — PAIN DESCRIPTION - PAIN TYPE: TYPE: ACUTE PAIN

## 2021-04-03 ASSESSMENT — PAIN DESCRIPTION - FREQUENCY: FREQUENCY: INTERMITTENT

## 2021-04-03 ASSESSMENT — PAIN DESCRIPTION - LOCATION: LOCATION: LEG

## 2021-04-03 ASSESSMENT — PAIN DESCRIPTION - ORIENTATION: ORIENTATION: LEFT

## 2021-04-03 ASSESSMENT — PAIN SCALES - GENERAL: PAINLEVEL_OUTOF10: 2

## 2021-04-03 NOTE — PROGRESS NOTES
POWER MIDLINE (CHG) Placement 4/3/2021    Product number: [id number]   Lot Number: [id number]      Ultrasound: YES   Left Basilic vein:                Upper Arm Circumference: 40    Size: 4.5    Exposed Length: 0    Internal Length: 15   Cut: 0   Vein Measurement: 0.62    Leighton Baker  4/3/2021  10:48 AM

## 2021-04-03 NOTE — PROGRESS NOTES
CALLED TO ASSESS RIGHT ARM VASCULAR DEVICE. THE LINE IS EXPOSED 2 CM. NO EDEMA, PAIN, REDNESS OR DRAINAGE NOTED. THE LINE WILL NOT FLUSH DUE TO TOTAL OCCLUSION OF UNKNOWN ORIGIN. LINE REMOVED PER Abhijit Hollingsworth RN. DSD APPLIED. NO ACTIVE BLEEDING. NEW LEFT MIDLINE CATH PLACED PER ORDER FOR 11 MORE DAYS OF DAPTO. . PT. TOLERATED PROCEDURE WELL. . SEE PROGRESS NOTE

## 2021-04-03 NOTE — PROGRESS NOTES
Call received from [facility] [address] from IV team that they will come and assess PICC line when patient arrives. Call placed to Dr Leona Vargas for order for midline and if that was not able to be performed then an order for a PICC line. Orders received and placed in paper chart.

## 2021-04-03 NOTE — PROGRESS NOTES
IV team arrived and assessed PICC line (see their progress note). New midline was placed per order. Patient tolerated well and feels comfortable. Vital signs stable including pulse oxygenation. Treatment began at this time with no difficulties.

## 2021-04-04 ENCOUNTER — HOSPITAL ENCOUNTER (OUTPATIENT)
Dept: INFUSION THERAPY | Age: 51
Setting detail: INFUSION SERIES
Discharge: HOME | End: 2021-04-04
Payer: COMMERCIAL

## 2021-04-04 VITALS
DIASTOLIC BLOOD PRESSURE: 77 MMHG | TEMPERATURE: 98.2 F | RESPIRATION RATE: 18 BRPM | HEART RATE: 79 BPM | SYSTOLIC BLOOD PRESSURE: 126 MMHG

## 2021-04-04 DIAGNOSIS — L03.116 CELLULITIS OF LEFT LOWER EXTREMITY: ICD-10-CM

## 2021-04-04 DIAGNOSIS — L03.116 CELLULITIS OF LEFT FOOT: Primary | ICD-10-CM

## 2021-04-04 PROCEDURE — 96365 THER/PROPH/DIAG IV INF INIT: CPT

## 2021-04-04 PROCEDURE — 2580000003 HC RX 258: Performed by: INTERNAL MEDICINE

## 2021-04-04 PROCEDURE — 6360000002 HC RX W HCPCS: Performed by: INTERNAL MEDICINE

## 2021-04-04 PROCEDURE — 6360000002 HC RX W HCPCS: Performed by: SPECIALIST

## 2021-04-04 RX ORDER — SODIUM CHLORIDE 0.9 % (FLUSH) 0.9 %
10 SYRINGE (ML) INJECTION PRN
Status: CANCELLED | OUTPATIENT
Start: 2021-04-05

## 2021-04-04 RX ORDER — HEPARIN SODIUM (PORCINE) LOCK FLUSH IV SOLN 100 UNIT/ML 100 UNIT/ML
300 SOLUTION INTRAVENOUS PRN
Status: CANCELLED | OUTPATIENT
Start: 2021-04-05

## 2021-04-04 RX ORDER — HEPARIN SODIUM (PORCINE) LOCK FLUSH IV SOLN 100 UNIT/ML 100 UNIT/ML
300 SOLUTION INTRAVENOUS PRN
Status: DISCONTINUED | OUTPATIENT
Start: 2021-04-04 | End: 2021-04-05 | Stop reason: HOSPADM

## 2021-04-04 RX ORDER — SODIUM CHLORIDE 0.9 % (FLUSH) 0.9 %
10 SYRINGE (ML) INJECTION PRN
Status: DISCONTINUED | OUTPATIENT
Start: 2021-04-04 | End: 2021-04-05 | Stop reason: HOSPADM

## 2021-04-04 RX ADMIN — SODIUM CHLORIDE 450 MG: 9 INJECTION, SOLUTION INTRAVENOUS at 09:40

## 2021-04-04 RX ADMIN — SODIUM CHLORIDE, PRESERVATIVE FREE 10 ML: 5 INJECTION INTRAVENOUS at 09:38

## 2021-04-04 RX ADMIN — Medication 300 UNITS: at 10:17

## 2021-04-04 RX ADMIN — SODIUM CHLORIDE, PRESERVATIVE FREE 10 ML: 5 INJECTION INTRAVENOUS at 10:17

## 2021-04-04 ASSESSMENT — PAIN - FUNCTIONAL ASSESSMENT: PAIN_FUNCTIONAL_ASSESSMENT: PREVENTS OR INTERFERES WITH MANY ACTIVE NOT PASSIVE ACTIVITIES

## 2021-04-04 ASSESSMENT — PAIN DESCRIPTION - FREQUENCY: FREQUENCY: INTERMITTENT

## 2021-04-04 ASSESSMENT — PAIN DESCRIPTION - LOCATION: LOCATION: LEG;SCROTUM

## 2021-04-04 ASSESSMENT — PAIN DESCRIPTION - ONSET: ONSET: ON-GOING

## 2021-04-04 ASSESSMENT — PAIN DESCRIPTION - ORIENTATION: ORIENTATION: LEFT

## 2021-04-04 ASSESSMENT — PAIN DESCRIPTION - PROGRESSION: CLINICAL_PROGRESSION: GRADUALLY IMPROVING

## 2021-04-04 ASSESSMENT — PAIN DESCRIPTION - DESCRIPTORS: DESCRIPTORS: ACHING;CONSTANT;DISCOMFORT

## 2021-04-05 ENCOUNTER — HOSPITAL ENCOUNTER (OUTPATIENT)
Dept: INFUSION THERAPY | Age: 51
Setting detail: INFUSION SERIES
Discharge: HOME | End: 2021-04-05
Payer: COMMERCIAL

## 2021-04-05 VITALS
HEART RATE: 77 BPM | DIASTOLIC BLOOD PRESSURE: 90 MMHG | RESPIRATION RATE: 18 BRPM | TEMPERATURE: 97.7 F | SYSTOLIC BLOOD PRESSURE: 143 MMHG

## 2021-04-05 DIAGNOSIS — L03.116 CELLULITIS OF LEFT FOOT: Primary | ICD-10-CM

## 2021-04-05 DIAGNOSIS — L03.116 CELLULITIS OF LEFT LOWER EXTREMITY: ICD-10-CM

## 2021-04-05 LAB
ALBUMIN SERPL-MCNC: 3.9 G/DL (ref 3.5–5.2)
ALP BLD-CCNC: 86 U/L (ref 40–129)
ALT SERPL-CCNC: 51 U/L (ref 0–40)
AST SERPL-CCNC: 25 U/L (ref 0–39)
BASOPHILS ABSOLUTE: 0.05 E9/L (ref 0–0.2)
BASOPHILS RELATIVE PERCENT: 0.6 % (ref 0–2)
BILIRUB SERPL-MCNC: 0.3 MG/DL (ref 0–1.2)
BILIRUBIN DIRECT: <0.2 MG/DL (ref 0–0.3)
BILIRUBIN, INDIRECT: ABNORMAL MG/DL (ref 0–1)
BUN BLDV-MCNC: 17 MG/DL (ref 6–20)
C-REACTIVE PROTEIN: 0.3 MG/DL (ref 0–0.4)
CREAT SERPL-MCNC: 0.8 MG/DL (ref 0.7–1.2)
EOSINOPHILS ABSOLUTE: 0.2 E9/L (ref 0.05–0.5)
EOSINOPHILS RELATIVE PERCENT: 2.4 % (ref 0–6)
GFR AFRICAN AMERICAN: >60
GFR NON-AFRICAN AMERICAN: >60 ML/MIN/1.73
HCT VFR BLD CALC: 40.4 % (ref 37–54)
HEMOGLOBIN: 13.1 G/DL (ref 12.5–16.5)
IMMATURE GRANULOCYTES #: 0.03 E9/L
IMMATURE GRANULOCYTES %: 0.4 % (ref 0–5)
LYMPHOCYTES ABSOLUTE: 2.46 E9/L (ref 1.5–4)
LYMPHOCYTES RELATIVE PERCENT: 29.3 % (ref 20–42)
MCH RBC QN AUTO: 28.8 PG (ref 26–35)
MCHC RBC AUTO-ENTMCNC: 32.4 % (ref 32–34.5)
MCV RBC AUTO: 88.8 FL (ref 80–99.9)
MONOCYTES ABSOLUTE: 0.53 E9/L (ref 0.1–0.95)
MONOCYTES RELATIVE PERCENT: 6.3 % (ref 2–12)
NEUTROPHILS ABSOLUTE: 5.14 E9/L (ref 1.8–7.3)
NEUTROPHILS RELATIVE PERCENT: 61 % (ref 43–80)
PDW BLD-RTO: 12.3 FL (ref 11.5–15)
PLATELET # BLD: 250 E9/L (ref 130–450)
PMV BLD AUTO: 9.6 FL (ref 7–12)
RBC # BLD: 4.55 E12/L (ref 3.8–5.8)
SEDIMENTATION RATE, ERYTHROCYTE: 23 MM/HR (ref 0–15)
TOTAL PROTEIN: 6.6 G/DL (ref 6.4–8.3)
WBC # BLD: 8.4 E9/L (ref 4.5–11.5)

## 2021-04-05 PROCEDURE — 2580000003 HC RX 258: Performed by: INTERNAL MEDICINE

## 2021-04-05 PROCEDURE — 6360000002 HC RX W HCPCS: Performed by: SPECIALIST

## 2021-04-05 PROCEDURE — 82565 ASSAY OF CREATININE: CPT

## 2021-04-05 PROCEDURE — 84520 ASSAY OF UREA NITROGEN: CPT

## 2021-04-05 PROCEDURE — 80076 HEPATIC FUNCTION PANEL: CPT

## 2021-04-05 PROCEDURE — 85025 COMPLETE CBC W/AUTO DIFF WBC: CPT

## 2021-04-05 PROCEDURE — 99211 OFF/OP EST MAY X REQ PHY/QHP: CPT

## 2021-04-05 PROCEDURE — 86140 C-REACTIVE PROTEIN: CPT

## 2021-04-05 PROCEDURE — 96365 THER/PROPH/DIAG IV INF INIT: CPT

## 2021-04-05 PROCEDURE — 6360000002 HC RX W HCPCS: Performed by: INTERNAL MEDICINE

## 2021-04-05 PROCEDURE — 85651 RBC SED RATE NONAUTOMATED: CPT

## 2021-04-05 PROCEDURE — 36591 DRAW BLOOD OFF VENOUS DEVICE: CPT

## 2021-04-05 RX ORDER — HEPARIN SODIUM (PORCINE) LOCK FLUSH IV SOLN 100 UNIT/ML 100 UNIT/ML
300 SOLUTION INTRAVENOUS PRN
Status: CANCELLED | OUTPATIENT
Start: 2021-04-06

## 2021-04-05 RX ORDER — SODIUM CHLORIDE 0.9 % (FLUSH) 0.9 %
10 SYRINGE (ML) INJECTION PRN
Status: CANCELLED | OUTPATIENT
Start: 2021-04-06

## 2021-04-05 RX ORDER — SODIUM CHLORIDE 0.9 % (FLUSH) 0.9 %
10 SYRINGE (ML) INJECTION PRN
Status: DISCONTINUED | OUTPATIENT
Start: 2021-04-05 | End: 2021-04-06 | Stop reason: HOSPADM

## 2021-04-05 RX ORDER — HEPARIN SODIUM (PORCINE) LOCK FLUSH IV SOLN 100 UNIT/ML 100 UNIT/ML
300 SOLUTION INTRAVENOUS PRN
Status: DISCONTINUED | OUTPATIENT
Start: 2021-04-05 | End: 2021-04-06 | Stop reason: HOSPADM

## 2021-04-05 RX ADMIN — SODIUM CHLORIDE, PRESERVATIVE FREE 10 ML: 5 INJECTION INTRAVENOUS at 13:42

## 2021-04-05 RX ADMIN — SODIUM CHLORIDE, PRESERVATIVE FREE 10 ML: 5 INJECTION INTRAVENOUS at 13:15

## 2021-04-05 RX ADMIN — DAPTOMYCIN 450 MG: 500 INJECTION, POWDER, LYOPHILIZED, FOR SOLUTION INTRAVENOUS at 13:15

## 2021-04-05 RX ADMIN — Medication 300 UNITS: at 13:42

## 2021-04-05 ASSESSMENT — PAIN DESCRIPTION - ORIENTATION: ORIENTATION: LEFT

## 2021-04-05 ASSESSMENT — PAIN DESCRIPTION - DESCRIPTORS: DESCRIPTORS: ACHING

## 2021-04-05 ASSESSMENT — PAIN DESCRIPTION - ONSET: ONSET: ON-GOING

## 2021-04-05 ASSESSMENT — PAIN DESCRIPTION - LOCATION: LOCATION: LEG

## 2021-04-05 ASSESSMENT — PAIN DESCRIPTION - PAIN TYPE: TYPE: ACUTE PAIN

## 2021-04-05 ASSESSMENT — PAIN DESCRIPTION - PROGRESSION: CLINICAL_PROGRESSION: GRADUALLY WORSENING

## 2021-04-05 ASSESSMENT — PAIN SCALES - GENERAL: PAINLEVEL_OUTOF10: 1

## 2021-04-05 ASSESSMENT — PAIN DESCRIPTION - FREQUENCY: FREQUENCY: INTERMITTENT

## 2021-04-06 ENCOUNTER — HOSPITAL ENCOUNTER (OUTPATIENT)
Dept: INFUSION THERAPY | Age: 51
Setting detail: INFUSION SERIES
Discharge: HOME | End: 2021-04-06
Payer: COMMERCIAL

## 2021-04-06 VITALS — TEMPERATURE: 97.5 F | SYSTOLIC BLOOD PRESSURE: 113 MMHG | DIASTOLIC BLOOD PRESSURE: 63 MMHG | HEART RATE: 79 BPM

## 2021-04-06 DIAGNOSIS — L03.116 CELLULITIS OF LEFT FOOT: Primary | ICD-10-CM

## 2021-04-06 DIAGNOSIS — L03.116 CELLULITIS OF LEFT LOWER EXTREMITY: ICD-10-CM

## 2021-04-06 PROCEDURE — 2580000003 HC RX 258: Performed by: INTERNAL MEDICINE

## 2021-04-06 PROCEDURE — 6360000002 HC RX W HCPCS: Performed by: SPECIALIST

## 2021-04-06 PROCEDURE — 96365 THER/PROPH/DIAG IV INF INIT: CPT

## 2021-04-06 PROCEDURE — 6360000002 HC RX W HCPCS: Performed by: INTERNAL MEDICINE

## 2021-04-06 RX ORDER — HEPARIN SODIUM (PORCINE) LOCK FLUSH IV SOLN 100 UNIT/ML 100 UNIT/ML
300 SOLUTION INTRAVENOUS PRN
Status: CANCELLED | OUTPATIENT
Start: 2021-04-07

## 2021-04-06 RX ORDER — SODIUM CHLORIDE 0.9 % (FLUSH) 0.9 %
10 SYRINGE (ML) INJECTION PRN
Status: CANCELLED | OUTPATIENT
Start: 2021-04-07

## 2021-04-06 RX ORDER — HEPARIN SODIUM (PORCINE) LOCK FLUSH IV SOLN 100 UNIT/ML 100 UNIT/ML
300 SOLUTION INTRAVENOUS PRN
Status: DISCONTINUED | OUTPATIENT
Start: 2021-04-06 | End: 2021-04-07 | Stop reason: HOSPADM

## 2021-04-06 RX ORDER — SODIUM CHLORIDE 0.9 % (FLUSH) 0.9 %
10 SYRINGE (ML) INJECTION PRN
Status: DISCONTINUED | OUTPATIENT
Start: 2021-04-06 | End: 2021-04-07 | Stop reason: HOSPADM

## 2021-04-06 RX ADMIN — SODIUM CHLORIDE, PRESERVATIVE FREE 10 ML: 5 INJECTION INTRAVENOUS at 12:35

## 2021-04-06 RX ADMIN — Medication 300 UNITS: at 13:13

## 2021-04-06 RX ADMIN — DAPTOMYCIN 450 MG: 500 INJECTION, POWDER, LYOPHILIZED, FOR SOLUTION INTRAVENOUS at 12:35

## 2021-04-06 RX ADMIN — SODIUM CHLORIDE, PRESERVATIVE FREE 10 ML: 5 INJECTION INTRAVENOUS at 12:36

## 2021-04-07 ENCOUNTER — HOSPITAL ENCOUNTER (OUTPATIENT)
Dept: INFUSION THERAPY | Age: 51
Setting detail: INFUSION SERIES
Discharge: HOME | End: 2021-04-07
Payer: COMMERCIAL

## 2021-04-07 VITALS — DIASTOLIC BLOOD PRESSURE: 79 MMHG | SYSTOLIC BLOOD PRESSURE: 124 MMHG | HEART RATE: 80 BPM | TEMPERATURE: 98.2 F

## 2021-04-07 DIAGNOSIS — L03.116 CELLULITIS OF LEFT FOOT: Primary | ICD-10-CM

## 2021-04-07 DIAGNOSIS — L03.116 CELLULITIS OF LEFT LOWER EXTREMITY: ICD-10-CM

## 2021-04-07 PROCEDURE — 96365 THER/PROPH/DIAG IV INF INIT: CPT

## 2021-04-07 PROCEDURE — 6360000002 HC RX W HCPCS: Performed by: INTERNAL MEDICINE

## 2021-04-07 PROCEDURE — 6360000002 HC RX W HCPCS: Performed by: SPECIALIST

## 2021-04-07 PROCEDURE — 2580000003 HC RX 258: Performed by: INTERNAL MEDICINE

## 2021-04-07 RX ORDER — HEPARIN SODIUM (PORCINE) LOCK FLUSH IV SOLN 100 UNIT/ML 100 UNIT/ML
300 SOLUTION INTRAVENOUS PRN
Status: DISCONTINUED | OUTPATIENT
Start: 2021-04-07 | End: 2021-04-08 | Stop reason: HOSPADM

## 2021-04-07 RX ORDER — HEPARIN SODIUM (PORCINE) LOCK FLUSH IV SOLN 100 UNIT/ML 100 UNIT/ML
300 SOLUTION INTRAVENOUS PRN
Status: CANCELLED | OUTPATIENT
Start: 2021-04-08

## 2021-04-07 RX ORDER — SODIUM CHLORIDE 0.9 % (FLUSH) 0.9 %
10 SYRINGE (ML) INJECTION PRN
Status: CANCELLED | OUTPATIENT
Start: 2021-04-08

## 2021-04-07 RX ORDER — SODIUM CHLORIDE 0.9 % (FLUSH) 0.9 %
10 SYRINGE (ML) INJECTION PRN
Status: DISCONTINUED | OUTPATIENT
Start: 2021-04-07 | End: 2021-04-08 | Stop reason: HOSPADM

## 2021-04-07 RX ADMIN — HEPARIN 300 UNITS: 100 SYRINGE at 13:20

## 2021-04-07 RX ADMIN — SODIUM CHLORIDE, PRESERVATIVE FREE 10 ML: 5 INJECTION INTRAVENOUS at 12:42

## 2021-04-07 RX ADMIN — SODIUM CHLORIDE, PRESERVATIVE FREE 10 ML: 5 INJECTION INTRAVENOUS at 13:20

## 2021-04-07 RX ADMIN — DAPTOMYCIN 450 MG: 500 INJECTION, POWDER, LYOPHILIZED, FOR SOLUTION INTRAVENOUS at 12:44

## 2021-04-08 ENCOUNTER — HOSPITAL ENCOUNTER (OUTPATIENT)
Dept: INFUSION THERAPY | Age: 51
Setting detail: INFUSION SERIES
Discharge: HOME | End: 2021-04-08
Payer: COMMERCIAL

## 2021-04-08 VITALS — TEMPERATURE: 98.8 F | SYSTOLIC BLOOD PRESSURE: 144 MMHG | HEART RATE: 82 BPM | DIASTOLIC BLOOD PRESSURE: 73 MMHG

## 2021-04-08 DIAGNOSIS — L03.116 CELLULITIS OF LEFT FOOT: Primary | ICD-10-CM

## 2021-04-08 DIAGNOSIS — L03.116 CELLULITIS OF LEFT LOWER EXTREMITY: ICD-10-CM

## 2021-04-08 LAB
ALBUMIN SERPL-MCNC: 4 G/DL (ref 3.5–5.2)
ALP BLD-CCNC: 85 U/L (ref 40–129)
ALT SERPL-CCNC: 58 U/L (ref 0–40)
AST SERPL-CCNC: 33 U/L (ref 0–39)
BASOPHILS ABSOLUTE: 0.04 E9/L (ref 0–0.2)
BASOPHILS RELATIVE PERCENT: 0.4 % (ref 0–2)
BILIRUB SERPL-MCNC: 0.5 MG/DL (ref 0–1.2)
BILIRUBIN DIRECT: <0.2 MG/DL (ref 0–0.3)
BILIRUBIN, INDIRECT: ABNORMAL MG/DL (ref 0–1)
BUN BLDV-MCNC: 15 MG/DL (ref 6–20)
CREAT SERPL-MCNC: 0.8 MG/DL (ref 0.7–1.2)
EOSINOPHILS ABSOLUTE: 0.21 E9/L (ref 0.05–0.5)
EOSINOPHILS RELATIVE PERCENT: 2.3 % (ref 0–6)
GFR AFRICAN AMERICAN: >60
GFR NON-AFRICAN AMERICAN: >60 ML/MIN/1.73
HCT VFR BLD CALC: 40.1 % (ref 37–54)
HEMOGLOBIN: 13.3 G/DL (ref 12.5–16.5)
IMMATURE GRANULOCYTES #: 0.01 E9/L
IMMATURE GRANULOCYTES %: 0.1 % (ref 0–5)
LYMPHOCYTES ABSOLUTE: 2.01 E9/L (ref 1.5–4)
LYMPHOCYTES RELATIVE PERCENT: 22.3 % (ref 20–42)
MCH RBC QN AUTO: 29.3 PG (ref 26–35)
MCHC RBC AUTO-ENTMCNC: 33.2 % (ref 32–34.5)
MCV RBC AUTO: 88.3 FL (ref 80–99.9)
MONOCYTES ABSOLUTE: 0.59 E9/L (ref 0.1–0.95)
MONOCYTES RELATIVE PERCENT: 6.6 % (ref 2–12)
NEUTROPHILS ABSOLUTE: 6.14 E9/L (ref 1.8–7.3)
NEUTROPHILS RELATIVE PERCENT: 68.3 % (ref 43–80)
PDW BLD-RTO: 12.6 FL (ref 11.5–15)
PLATELET # BLD: 269 E9/L (ref 130–450)
PMV BLD AUTO: 10 FL (ref 7–12)
RBC # BLD: 4.54 E12/L (ref 3.8–5.8)
TOTAL PROTEIN: 6.8 G/DL (ref 6.4–8.3)
WBC # BLD: 9 E9/L (ref 4.5–11.5)

## 2021-04-08 PROCEDURE — 2580000003 HC RX 258: Performed by: INTERNAL MEDICINE

## 2021-04-08 PROCEDURE — 36415 COLL VENOUS BLD VENIPUNCTURE: CPT

## 2021-04-08 PROCEDURE — 99211 OFF/OP EST MAY X REQ PHY/QHP: CPT

## 2021-04-08 PROCEDURE — 84520 ASSAY OF UREA NITROGEN: CPT

## 2021-04-08 PROCEDURE — 85025 COMPLETE CBC W/AUTO DIFF WBC: CPT

## 2021-04-08 PROCEDURE — 80076 HEPATIC FUNCTION PANEL: CPT

## 2021-04-08 PROCEDURE — 36592 COLLECT BLOOD FROM PICC: CPT

## 2021-04-08 PROCEDURE — 96365 THER/PROPH/DIAG IV INF INIT: CPT

## 2021-04-08 PROCEDURE — 6360000002 HC RX W HCPCS: Performed by: SPECIALIST

## 2021-04-08 PROCEDURE — 6360000002 HC RX W HCPCS: Performed by: INTERNAL MEDICINE

## 2021-04-08 PROCEDURE — 82565 ASSAY OF CREATININE: CPT

## 2021-04-08 RX ORDER — SODIUM CHLORIDE 0.9 % (FLUSH) 0.9 %
10 SYRINGE (ML) INJECTION PRN
Status: CANCELLED | OUTPATIENT
Start: 2021-04-09

## 2021-04-08 RX ORDER — HEPARIN SODIUM (PORCINE) LOCK FLUSH IV SOLN 100 UNIT/ML 100 UNIT/ML
300 SOLUTION INTRAVENOUS PRN
Status: DISCONTINUED | OUTPATIENT
Start: 2021-04-08 | End: 2021-04-09 | Stop reason: HOSPADM

## 2021-04-08 RX ORDER — HEPARIN SODIUM (PORCINE) LOCK FLUSH IV SOLN 100 UNIT/ML 100 UNIT/ML
300 SOLUTION INTRAVENOUS PRN
Status: CANCELLED | OUTPATIENT
Start: 2021-04-09

## 2021-04-08 RX ORDER — SODIUM CHLORIDE 0.9 % (FLUSH) 0.9 %
10 SYRINGE (ML) INJECTION PRN
Status: DISCONTINUED | OUTPATIENT
Start: 2021-04-08 | End: 2021-04-09 | Stop reason: HOSPADM

## 2021-04-08 RX ADMIN — SODIUM CHLORIDE, PRESERVATIVE FREE 10 ML: 5 INJECTION INTRAVENOUS at 13:36

## 2021-04-08 RX ADMIN — HEPARIN 300 UNITS: 100 SYRINGE at 13:36

## 2021-04-08 RX ADMIN — SODIUM CHLORIDE, PRESERVATIVE FREE 10 ML: 5 INJECTION INTRAVENOUS at 12:26

## 2021-04-08 RX ADMIN — DAPTOMYCIN 450 MG: 500 INJECTION, POWDER, LYOPHILIZED, FOR SOLUTION INTRAVENOUS at 12:33

## 2021-04-09 ENCOUNTER — HOSPITAL ENCOUNTER (OUTPATIENT)
Dept: INFUSION THERAPY | Age: 51
Setting detail: INFUSION SERIES
Discharge: HOME | End: 2021-04-09
Payer: COMMERCIAL

## 2021-04-09 VITALS
HEART RATE: 85 BPM | DIASTOLIC BLOOD PRESSURE: 70 MMHG | TEMPERATURE: 97.5 F | SYSTOLIC BLOOD PRESSURE: 133 MMHG | RESPIRATION RATE: 18 BRPM

## 2021-04-09 DIAGNOSIS — L03.116 CELLULITIS OF LEFT FOOT: Primary | ICD-10-CM

## 2021-04-09 DIAGNOSIS — L03.116 CELLULITIS OF LEFT LOWER EXTREMITY: ICD-10-CM

## 2021-04-09 PROCEDURE — 2580000003 HC RX 258: Performed by: INTERNAL MEDICINE

## 2021-04-09 PROCEDURE — 6360000002 HC RX W HCPCS: Performed by: SPECIALIST

## 2021-04-09 PROCEDURE — 96365 THER/PROPH/DIAG IV INF INIT: CPT

## 2021-04-09 PROCEDURE — 6360000002 HC RX W HCPCS: Performed by: INTERNAL MEDICINE

## 2021-04-09 RX ORDER — SODIUM CHLORIDE 0.9 % (FLUSH) 0.9 %
10 SYRINGE (ML) INJECTION PRN
Status: CANCELLED | OUTPATIENT
Start: 2021-04-10

## 2021-04-09 RX ORDER — HEPARIN SODIUM (PORCINE) LOCK FLUSH IV SOLN 100 UNIT/ML 100 UNIT/ML
300 SOLUTION INTRAVENOUS PRN
Status: CANCELLED | OUTPATIENT
Start: 2021-04-10

## 2021-04-09 RX ORDER — SODIUM CHLORIDE 0.9 % (FLUSH) 0.9 %
10 SYRINGE (ML) INJECTION PRN
Status: DISCONTINUED | OUTPATIENT
Start: 2021-04-09 | End: 2021-04-10 | Stop reason: HOSPADM

## 2021-04-09 RX ORDER — HEPARIN SODIUM (PORCINE) LOCK FLUSH IV SOLN 100 UNIT/ML 100 UNIT/ML
300 SOLUTION INTRAVENOUS PRN
Status: DISCONTINUED | OUTPATIENT
Start: 2021-04-09 | End: 2021-04-10 | Stop reason: HOSPADM

## 2021-04-09 RX ADMIN — DAPTOMYCIN 450 MG: 500 INJECTION, POWDER, LYOPHILIZED, FOR SOLUTION INTRAVENOUS at 12:47

## 2021-04-09 RX ADMIN — HEPARIN 300 UNITS: 100 SYRINGE at 13:24

## 2021-04-09 RX ADMIN — SODIUM CHLORIDE, PRESERVATIVE FREE 10 ML: 5 INJECTION INTRAVENOUS at 12:45

## 2021-04-09 RX ADMIN — SODIUM CHLORIDE, PRESERVATIVE FREE 10 ML: 5 INJECTION INTRAVENOUS at 13:24

## 2021-04-10 ENCOUNTER — HOSPITAL ENCOUNTER (OUTPATIENT)
Dept: INFUSION THERAPY | Age: 51
Setting detail: INFUSION SERIES
Discharge: HOME | End: 2021-04-10
Payer: COMMERCIAL

## 2021-04-10 VITALS
OXYGEN SATURATION: 97 % | HEART RATE: 82 BPM | TEMPERATURE: 97.6 F | RESPIRATION RATE: 18 BRPM | SYSTOLIC BLOOD PRESSURE: 111 MMHG | DIASTOLIC BLOOD PRESSURE: 50 MMHG

## 2021-04-10 DIAGNOSIS — L03.116 CELLULITIS OF LEFT LOWER EXTREMITY: ICD-10-CM

## 2021-04-10 DIAGNOSIS — L03.116 CELLULITIS OF LEFT FOOT: Primary | ICD-10-CM

## 2021-04-10 PROCEDURE — 6360000002 HC RX W HCPCS: Performed by: SPECIALIST

## 2021-04-10 PROCEDURE — 2580000003 HC RX 258: Performed by: INTERNAL MEDICINE

## 2021-04-10 PROCEDURE — 6360000002 HC RX W HCPCS: Performed by: INTERNAL MEDICINE

## 2021-04-10 PROCEDURE — 96365 THER/PROPH/DIAG IV INF INIT: CPT

## 2021-04-10 RX ORDER — SODIUM CHLORIDE 0.9 % (FLUSH) 0.9 %
10 SYRINGE (ML) INJECTION PRN
Status: CANCELLED | OUTPATIENT
Start: 2021-04-11

## 2021-04-10 RX ORDER — SODIUM CHLORIDE 0.9 % (FLUSH) 0.9 %
10 SYRINGE (ML) INJECTION PRN
Status: DISCONTINUED | OUTPATIENT
Start: 2021-04-10 | End: 2021-04-11 | Stop reason: HOSPADM

## 2021-04-10 RX ORDER — HEPARIN SODIUM (PORCINE) LOCK FLUSH IV SOLN 100 UNIT/ML 100 UNIT/ML
300 SOLUTION INTRAVENOUS PRN
Status: DISCONTINUED | OUTPATIENT
Start: 2021-04-10 | End: 2021-04-11 | Stop reason: HOSPADM

## 2021-04-10 RX ORDER — HEPARIN SODIUM (PORCINE) LOCK FLUSH IV SOLN 100 UNIT/ML 100 UNIT/ML
300 SOLUTION INTRAVENOUS PRN
Status: CANCELLED | OUTPATIENT
Start: 2021-04-11

## 2021-04-10 RX ADMIN — SODIUM CHLORIDE, PRESERVATIVE FREE 10 ML: 5 INJECTION INTRAVENOUS at 10:00

## 2021-04-10 RX ADMIN — SODIUM CHLORIDE 450 MG: 9 INJECTION, SOLUTION INTRAVENOUS at 09:31

## 2021-04-10 RX ADMIN — SODIUM CHLORIDE, PRESERVATIVE FREE 10 ML: 5 INJECTION INTRAVENOUS at 09:31

## 2021-04-10 RX ADMIN — Medication 300 UNITS: at 10:00

## 2021-04-10 ASSESSMENT — PAIN SCALES - GENERAL: PAINLEVEL_OUTOF10: 0

## 2021-04-11 ENCOUNTER — HOSPITAL ENCOUNTER (OUTPATIENT)
Dept: INFUSION THERAPY | Age: 51
Setting detail: INFUSION SERIES
Discharge: HOME | End: 2021-04-11
Payer: COMMERCIAL

## 2021-04-11 VITALS
SYSTOLIC BLOOD PRESSURE: 136 MMHG | DIASTOLIC BLOOD PRESSURE: 71 MMHG | HEART RATE: 85 BPM | TEMPERATURE: 97.2 F | RESPIRATION RATE: 18 BRPM | OXYGEN SATURATION: 98 %

## 2021-04-11 DIAGNOSIS — L03.116 CELLULITIS OF LEFT FOOT: Primary | ICD-10-CM

## 2021-04-11 DIAGNOSIS — L03.116 CELLULITIS OF LEFT LOWER EXTREMITY: ICD-10-CM

## 2021-04-11 PROCEDURE — 96365 THER/PROPH/DIAG IV INF INIT: CPT

## 2021-04-11 PROCEDURE — 2580000003 HC RX 258: Performed by: INTERNAL MEDICINE

## 2021-04-11 PROCEDURE — 6360000002 HC RX W HCPCS: Performed by: SPECIALIST

## 2021-04-11 PROCEDURE — 6360000002 HC RX W HCPCS: Performed by: INTERNAL MEDICINE

## 2021-04-11 RX ORDER — SODIUM CHLORIDE 0.9 % (FLUSH) 0.9 %
10 SYRINGE (ML) INJECTION PRN
Status: DISCONTINUED | OUTPATIENT
Start: 2021-04-11 | End: 2021-04-12 | Stop reason: HOSPADM

## 2021-04-11 RX ORDER — SODIUM CHLORIDE 0.9 % (FLUSH) 0.9 %
10 SYRINGE (ML) INJECTION PRN
Status: CANCELLED | OUTPATIENT
Start: 2021-04-12

## 2021-04-11 RX ORDER — HEPARIN SODIUM (PORCINE) LOCK FLUSH IV SOLN 100 UNIT/ML 100 UNIT/ML
300 SOLUTION INTRAVENOUS PRN
Status: DISCONTINUED | OUTPATIENT
Start: 2021-04-11 | End: 2021-04-12 | Stop reason: HOSPADM

## 2021-04-11 RX ORDER — HEPARIN SODIUM (PORCINE) LOCK FLUSH IV SOLN 100 UNIT/ML 100 UNIT/ML
300 SOLUTION INTRAVENOUS PRN
Status: CANCELLED | OUTPATIENT
Start: 2021-04-12

## 2021-04-11 RX ADMIN — Medication 300 UNITS: at 09:57

## 2021-04-11 RX ADMIN — SODIUM CHLORIDE 450 MG: 9 INJECTION, SOLUTION INTRAVENOUS at 09:26

## 2021-04-11 RX ADMIN — SODIUM CHLORIDE, PRESERVATIVE FREE 10 ML: 5 INJECTION INTRAVENOUS at 09:57

## 2021-04-11 RX ADMIN — SODIUM CHLORIDE, PRESERVATIVE FREE 10 ML: 5 INJECTION INTRAVENOUS at 09:26

## 2021-04-11 ASSESSMENT — PAIN SCALES - GENERAL: PAINLEVEL_OUTOF10: 0

## 2021-04-12 ENCOUNTER — HOSPITAL ENCOUNTER (OUTPATIENT)
Dept: INFUSION THERAPY | Age: 51
Setting detail: INFUSION SERIES
Discharge: HOME | End: 2021-04-12
Payer: COMMERCIAL

## 2021-04-12 VITALS
SYSTOLIC BLOOD PRESSURE: 130 MMHG | TEMPERATURE: 98.9 F | HEART RATE: 78 BPM | DIASTOLIC BLOOD PRESSURE: 92 MMHG | RESPIRATION RATE: 18 BRPM

## 2021-04-12 DIAGNOSIS — L03.116 CELLULITIS OF LEFT LOWER EXTREMITY: ICD-10-CM

## 2021-04-12 DIAGNOSIS — L03.116 CELLULITIS OF LEFT FOOT: Primary | ICD-10-CM

## 2021-04-12 LAB
ALBUMIN SERPL-MCNC: 4.1 G/DL (ref 3.5–5.2)
ALP BLD-CCNC: 88 U/L (ref 40–129)
ALT SERPL-CCNC: 44 U/L (ref 0–40)
AST SERPL-CCNC: 25 U/L (ref 0–39)
BASOPHILS ABSOLUTE: 0.03 E9/L (ref 0–0.2)
BASOPHILS RELATIVE PERCENT: 0.4 % (ref 0–2)
BILIRUB SERPL-MCNC: 0.4 MG/DL (ref 0–1.2)
BILIRUBIN DIRECT: <0.2 MG/DL (ref 0–0.3)
BILIRUBIN, INDIRECT: ABNORMAL MG/DL (ref 0–1)
BUN BLDV-MCNC: 16 MG/DL (ref 6–20)
C-REACTIVE PROTEIN: 0.3 MG/DL (ref 0–0.4)
CREAT SERPL-MCNC: 1 MG/DL (ref 0.7–1.2)
EOSINOPHILS ABSOLUTE: 0.25 E9/L (ref 0.05–0.5)
EOSINOPHILS RELATIVE PERCENT: 3.2 % (ref 0–6)
GFR AFRICAN AMERICAN: >60
GFR NON-AFRICAN AMERICAN: >60 ML/MIN/1.73
HCT VFR BLD CALC: 40.8 % (ref 37–54)
HEMOGLOBIN: 13.3 G/DL (ref 12.5–16.5)
IMMATURE GRANULOCYTES #: 0.02 E9/L
IMMATURE GRANULOCYTES %: 0.3 % (ref 0–5)
LYMPHOCYTES ABSOLUTE: 2.4 E9/L (ref 1.5–4)
LYMPHOCYTES RELATIVE PERCENT: 30.5 % (ref 20–42)
MCH RBC QN AUTO: 29.2 PG (ref 26–35)
MCHC RBC AUTO-ENTMCNC: 32.6 % (ref 32–34.5)
MCV RBC AUTO: 89.5 FL (ref 80–99.9)
MONOCYTES ABSOLUTE: 0.52 E9/L (ref 0.1–0.95)
MONOCYTES RELATIVE PERCENT: 6.6 % (ref 2–12)
NEUTROPHILS ABSOLUTE: 4.66 E9/L (ref 1.8–7.3)
NEUTROPHILS RELATIVE PERCENT: 59 % (ref 43–80)
PDW BLD-RTO: 12.9 FL (ref 11.5–15)
PLATELET # BLD: 228 E9/L (ref 130–450)
PMV BLD AUTO: 9.8 FL (ref 7–12)
RBC # BLD: 4.56 E12/L (ref 3.8–5.8)
SEDIMENTATION RATE, ERYTHROCYTE: 21 MM/HR (ref 0–15)
TOTAL PROTEIN: 6.8 G/DL (ref 6.4–8.3)
WBC # BLD: 7.9 E9/L (ref 4.5–11.5)

## 2021-04-12 PROCEDURE — 36592 COLLECT BLOOD FROM PICC: CPT

## 2021-04-12 PROCEDURE — 2580000003 HC RX 258: Performed by: INTERNAL MEDICINE

## 2021-04-12 PROCEDURE — 36415 COLL VENOUS BLD VENIPUNCTURE: CPT

## 2021-04-12 PROCEDURE — 85651 RBC SED RATE NONAUTOMATED: CPT

## 2021-04-12 PROCEDURE — 96365 THER/PROPH/DIAG IV INF INIT: CPT

## 2021-04-12 PROCEDURE — 85025 COMPLETE CBC W/AUTO DIFF WBC: CPT

## 2021-04-12 PROCEDURE — 86140 C-REACTIVE PROTEIN: CPT

## 2021-04-12 PROCEDURE — 80076 HEPATIC FUNCTION PANEL: CPT

## 2021-04-12 PROCEDURE — 6360000002 HC RX W HCPCS: Performed by: INTERNAL MEDICINE

## 2021-04-12 PROCEDURE — 6360000002 HC RX W HCPCS: Performed by: SPECIALIST

## 2021-04-12 PROCEDURE — 82565 ASSAY OF CREATININE: CPT

## 2021-04-12 PROCEDURE — 84520 ASSAY OF UREA NITROGEN: CPT

## 2021-04-12 RX ORDER — HEPARIN SODIUM (PORCINE) LOCK FLUSH IV SOLN 100 UNIT/ML 100 UNIT/ML
300 SOLUTION INTRAVENOUS PRN
Status: CANCELLED | OUTPATIENT
Start: 2021-04-13

## 2021-04-12 RX ORDER — HEPARIN SODIUM (PORCINE) LOCK FLUSH IV SOLN 100 UNIT/ML 100 UNIT/ML
300 SOLUTION INTRAVENOUS PRN
Status: DISCONTINUED | OUTPATIENT
Start: 2021-04-12 | End: 2021-04-13 | Stop reason: HOSPADM

## 2021-04-12 RX ORDER — SODIUM CHLORIDE 0.9 % (FLUSH) 0.9 %
10 SYRINGE (ML) INJECTION PRN
Status: CANCELLED | OUTPATIENT
Start: 2021-04-13

## 2021-04-12 RX ORDER — SODIUM CHLORIDE 0.9 % (FLUSH) 0.9 %
10 SYRINGE (ML) INJECTION PRN
Status: DISCONTINUED | OUTPATIENT
Start: 2021-04-12 | End: 2021-04-13 | Stop reason: HOSPADM

## 2021-04-12 RX ADMIN — SODIUM CHLORIDE, PRESERVATIVE FREE 10 ML: 5 INJECTION INTRAVENOUS at 13:01

## 2021-04-12 RX ADMIN — SODIUM CHLORIDE, PRESERVATIVE FREE 10 ML: 5 INJECTION INTRAVENOUS at 13:33

## 2021-04-12 RX ADMIN — DAPTOMYCIN 450 MG: 500 INJECTION, POWDER, LYOPHILIZED, FOR SOLUTION INTRAVENOUS at 13:03

## 2021-04-12 RX ADMIN — HEPARIN 300 UNITS: 100 SYRINGE at 13:34

## 2021-04-12 RX ADMIN — SODIUM CHLORIDE, PRESERVATIVE FREE 10 ML: 5 INJECTION INTRAVENOUS at 13:00

## 2021-04-13 ENCOUNTER — HOSPITAL ENCOUNTER (OUTPATIENT)
Dept: INFUSION THERAPY | Age: 51
Setting detail: INFUSION SERIES
Discharge: HOME | End: 2021-04-13
Payer: COMMERCIAL

## 2021-04-13 VITALS
TEMPERATURE: 99 F | DIASTOLIC BLOOD PRESSURE: 76 MMHG | SYSTOLIC BLOOD PRESSURE: 146 MMHG | HEART RATE: 84 BPM | RESPIRATION RATE: 18 BRPM

## 2021-04-13 DIAGNOSIS — L03.116 CELLULITIS OF LEFT LOWER EXTREMITY: ICD-10-CM

## 2021-04-13 DIAGNOSIS — L03.116 CELLULITIS OF LEFT FOOT: Primary | ICD-10-CM

## 2021-04-13 PROCEDURE — 6360000002 HC RX W HCPCS: Performed by: SPECIALIST

## 2021-04-13 PROCEDURE — 96365 THER/PROPH/DIAG IV INF INIT: CPT

## 2021-04-13 PROCEDURE — 2580000003 HC RX 258: Performed by: INTERNAL MEDICINE

## 2021-04-13 PROCEDURE — 6360000002 HC RX W HCPCS: Performed by: INTERNAL MEDICINE

## 2021-04-13 RX ORDER — HEPARIN SODIUM (PORCINE) LOCK FLUSH IV SOLN 100 UNIT/ML 100 UNIT/ML
300 SOLUTION INTRAVENOUS PRN
Status: CANCELLED | OUTPATIENT
Start: 2021-04-14

## 2021-04-13 RX ORDER — SODIUM CHLORIDE 0.9 % (FLUSH) 0.9 %
10 SYRINGE (ML) INJECTION PRN
Status: CANCELLED | OUTPATIENT
Start: 2021-04-14

## 2021-04-13 RX ORDER — HEPARIN SODIUM (PORCINE) LOCK FLUSH IV SOLN 100 UNIT/ML 100 UNIT/ML
300 SOLUTION INTRAVENOUS PRN
Status: DISCONTINUED | OUTPATIENT
Start: 2021-04-13 | End: 2021-04-14 | Stop reason: HOSPADM

## 2021-04-13 RX ORDER — SODIUM CHLORIDE 0.9 % (FLUSH) 0.9 %
10 SYRINGE (ML) INJECTION PRN
Status: DISCONTINUED | OUTPATIENT
Start: 2021-04-13 | End: 2021-04-14 | Stop reason: HOSPADM

## 2021-04-13 RX ADMIN — SODIUM CHLORIDE, PRESERVATIVE FREE 10 ML: 5 INJECTION INTRAVENOUS at 15:28

## 2021-04-13 RX ADMIN — DAPTOMYCIN 450 MG: 500 INJECTION, POWDER, LYOPHILIZED, FOR SOLUTION INTRAVENOUS at 14:45

## 2021-04-13 RX ADMIN — HEPARIN 300 UNITS: 100 SYRINGE at 15:28

## 2021-04-13 RX ADMIN — SODIUM CHLORIDE, PRESERVATIVE FREE 10 ML: 5 INJECTION INTRAVENOUS at 14:45

## 2021-04-14 ENCOUNTER — HOSPITAL ENCOUNTER (OUTPATIENT)
Dept: INFUSION THERAPY | Age: 51
Setting detail: INFUSION SERIES
Discharge: HOME | End: 2021-04-14
Payer: COMMERCIAL

## 2021-04-14 VITALS
RESPIRATION RATE: 18 BRPM | HEART RATE: 79 BPM | DIASTOLIC BLOOD PRESSURE: 76 MMHG | SYSTOLIC BLOOD PRESSURE: 131 MMHG | TEMPERATURE: 98.6 F

## 2021-04-14 DIAGNOSIS — L03.116 CELLULITIS OF LEFT FOOT: Primary | ICD-10-CM

## 2021-04-14 DIAGNOSIS — L03.116 CELLULITIS OF LEFT LOWER EXTREMITY: ICD-10-CM

## 2021-04-14 PROCEDURE — 99211 OFF/OP EST MAY X REQ PHY/QHP: CPT

## 2021-04-14 PROCEDURE — 96365 THER/PROPH/DIAG IV INF INIT: CPT

## 2021-04-14 PROCEDURE — 6360000002 HC RX W HCPCS: Performed by: INTERNAL MEDICINE

## 2021-04-14 PROCEDURE — 2580000003 HC RX 258: Performed by: INTERNAL MEDICINE

## 2021-04-14 RX ORDER — HEPARIN SODIUM (PORCINE) LOCK FLUSH IV SOLN 100 UNIT/ML 100 UNIT/ML
300 SOLUTION INTRAVENOUS PRN
Status: CANCELLED | OUTPATIENT
Start: 2021-04-15

## 2021-04-14 RX ORDER — SODIUM CHLORIDE 0.9 % (FLUSH) 0.9 %
10 SYRINGE (ML) INJECTION PRN
Status: CANCELLED | OUTPATIENT
Start: 2021-04-15

## 2021-04-14 RX ORDER — HEPARIN SODIUM (PORCINE) LOCK FLUSH IV SOLN 100 UNIT/ML 100 UNIT/ML
300 SOLUTION INTRAVENOUS PRN
Status: DISCONTINUED | OUTPATIENT
Start: 2021-04-14 | End: 2021-04-15 | Stop reason: HOSPADM

## 2021-04-14 RX ORDER — SODIUM CHLORIDE 0.9 % (FLUSH) 0.9 %
10 SYRINGE (ML) INJECTION PRN
Status: DISCONTINUED | OUTPATIENT
Start: 2021-04-14 | End: 2021-04-15 | Stop reason: HOSPADM

## 2021-04-14 RX ADMIN — DAPTOMYCIN 450 MG: 500 INJECTION, POWDER, LYOPHILIZED, FOR SOLUTION INTRAVENOUS at 12:46

## 2021-04-14 RX ADMIN — SODIUM CHLORIDE, PRESERVATIVE FREE 10 ML: 5 INJECTION INTRAVENOUS at 12:45

## 2021-04-14 NOTE — PROGRESS NOTES
Pt has been discharged per Dr. Emeli Diez. Mid line catheter removed without problems. Pressure held at site and DSD wrapped with coban applied. Pt tolerated well. Pt instructed on no heavy lifting today.

## 2021-09-30 ENCOUNTER — HOSPITAL ENCOUNTER (OUTPATIENT)
Age: 51
Discharge: HOME | End: 2021-10-02

## 2021-09-30 PROCEDURE — 88305 TISSUE EXAM BY PATHOLOGIST: CPT

## 2022-05-16 ENCOUNTER — OFFICE VISIT (OUTPATIENT)
Dept: CARDIOLOGY CLINIC | Age: 52
End: 2022-05-16
Payer: COMMERCIAL

## 2022-05-16 VITALS
BODY MASS INDEX: 45.1 KG/M2 | OXYGEN SATURATION: 97 % | SYSTOLIC BLOOD PRESSURE: 122 MMHG | HEIGHT: 70 IN | DIASTOLIC BLOOD PRESSURE: 70 MMHG | WEIGHT: 315 LBS | RESPIRATION RATE: 16 BRPM | HEART RATE: 69 BPM

## 2022-05-16 DIAGNOSIS — E78.2 MIXED HYPERLIPIDEMIA: ICD-10-CM

## 2022-05-16 DIAGNOSIS — I10 ESSENTIAL HYPERTENSION: ICD-10-CM

## 2022-05-16 DIAGNOSIS — R07.9 CHEST PAIN, UNSPECIFIED TYPE: Primary | ICD-10-CM

## 2022-05-16 DIAGNOSIS — I20.8 ATYPICAL ANGINA (HCC): ICD-10-CM

## 2022-05-16 DIAGNOSIS — I50.9 ACUTE DECOMPENSATED HEART FAILURE (HCC): ICD-10-CM

## 2022-05-16 DIAGNOSIS — E66.01 MORBID OBESITY (HCC): ICD-10-CM

## 2022-05-16 PROCEDURE — 99204 OFFICE O/P NEW MOD 45 MIN: CPT | Performed by: INTERNAL MEDICINE

## 2022-05-16 PROCEDURE — 93000 ELECTROCARDIOGRAM COMPLETE: CPT | Performed by: INTERNAL MEDICINE

## 2022-05-16 RX ORDER — OMEPRAZOLE 20 MG/1
20 CAPSULE, DELAYED RELEASE ORAL DAILY
COMMUNITY
End: 2022-08-22

## 2022-05-16 RX ORDER — MULTIVITAMIN WITH IRON
500 TABLET ORAL DAILY
COMMUNITY

## 2022-05-16 RX ORDER — FUROSEMIDE 20 MG/1
20 TABLET ORAL DAILY
Qty: 30 TABLET | Refills: 1 | Status: SHIPPED | OUTPATIENT
Start: 2022-05-16

## 2022-05-16 NOTE — PATIENT INSTRUCTIONS
 Continue all your medications at current doses.  Take lasix 20 mg daily for the next 5 days and call me with an update.  We will get your lipid panel faxed to my office for review.  We will schedule an echocardiogram.    I will give you a handout for healthy diet   Restrict sodium intake to less than 2-2.5 g/day. Restrict fluid intake to less than 2.2 L/day. Goal BP is less than 130/80.  If your weight increases by > 2 lbs in a day or >5 lbs in more than a day, take an extra lasix pill.  Please try to exercise for 150 minutes a week.  I will see you back in the office in 3 months. Please call the office at ([phone], option [phone]) if you have any questions.

## 2022-05-16 NOTE — PROGRESS NOTES
CHIEF COMPLAINT:   Chief Complaint   Patient presents with    Chest Pain     Consult, per Dr Antonio Izquierdo, d/t chest pain. Is also c/o sob with & without exertion, occ LE edema/palpitations.  Shortness of Breath    Edema    Palpitations        HISTORY OF PRESENT ILLNESS: Patient is a 46 y.o. male seen at the request of Salina Joyner DO to establish care with me. He has a history of morbid obesity, hyperlipidemia, BPH, no prior significant cardiac history. He has been referred to me for further evaluation of the above-mentioned complaints. For about a month, he has been noticing dyspnea on more than mild to moderate exertion. His symptoms have been essentially stable. He did have 1 episode of nonexertional numbness in his chest.  No recurrence since then. It was nonradiating at that time. It lasted for a few minutes. At today's office visit, He denies any palpitations, dizziness, pedal edema. The patient is capable of activities of daily living. There is dyspnea on more than moderate exertion. There is no orthopnea or PND. He is compliant with medications. His father does have coronary artery disease and had bypass in his 46s.             Past Medical History:   Diagnosis Date    Asthma     HLD (hyperlipidemia)        Allergies   Allergen Reactions    Neosporin Plus Max [facility]       Current Outpatient Medications   Medication Sig Dispense Refill    albuterol sulfate (PROAIR RESPICLICK) 771 (90 Base) MCG/ACT aerosol powder inhalation Inhale into the lungs every 4 hours as needed for Wheezing or Shortness of Breath      Multiple Vitamins-Minerals (CENTRUM SILVER 50+MEN PO) Take by mouth daily      magnesium (MAGNESIUM-OXIDE) 250 MG TABS tablet Take 500 mg by mouth daily      omeprazole (PRILOSEC) 20 MG delayed release capsule Take 20 mg by mouth daily      Omega-3 Fatty Acids (FISH OIL) 1200 MG CPDR Take by mouth daily      furosemide (LASIX) 20 MG tablet Take 1 tablet by mouth daily Take daily for the next 5 days and call me with an update. 30 tablet 1    fluticasone (FLONASE) 50 MCG/ACT nasal spray 1 spray by Each Nostril route daily      cetirizine (ZYRTEC) 5 MG tablet Take 10 mg by mouth daily       montelukast (SINGULAIR) 10 MG tablet Take 10 mg by mouth nightly      tamsulosin (FLOMAX) 0.4 MG capsule Take 0.8 mg by mouth daily      atorvastatin (LIPITOR) 40 MG tablet Take 40 mg by mouth daily       No current facility-administered medications for this visit. Social History     Socioeconomic History    Marital status: Single     Spouse name: Not on file    Number of children: Not on file    Years of education: Not on file    Highest education level: Not on file   Occupational History    Not on file   Tobacco Use    Smoking status: Never Smoker    Smokeless tobacco: Never Used   Vaping Use    Vaping Use: Never used   Substance and Sexual Activity    Alcohol use: Yes     Comment: occ beer    Drug use: Never    Sexual activity: Not on file   Other Topics Concern    Not on file   Social History Narrative    Drinks 1-2 cups of decaf coffee daily     Social Determinants of Health     Financial Resource Strain:     Difficulty of Paying Living Expenses: Not on file   Food Insecurity:     Worried About Running Out of Food in the Last Year: Not on file    Zoe of Food in the Last Year: Not on file   Transportation Needs:     Lack of Transportation (Medical): Not on file    Lack of Transportation (Non-Medical):  Not on file   Physical Activity:     Days of Exercise per Week: Not on file    Minutes of Exercise per Session: Not on file   Stress:     Feeling of Stress : Not on file   Social Connections:     Frequency of Communication with Friends and Family: Not on file    Frequency of Social Gatherings with Friends and Family: Not on file    Attends Tenriism Services: Not on file    Active Member of Clubs or Organizations: Not on file   [facility] Attends Club or Organization Meetings: Not on file    Marital Status: Not on file   Intimate Partner Violence:     Fear of Current or Ex-Partner: Not on file    Emotionally Abused: Not on file    Physically Abused: Not on file    Sexually Abused: Not on file   Housing Stability:     Unable to Pay for Housing in the Last Year: Not on file    Number of [place] in the Last Year: Not on file    Unstable Housing in the Last Year: Not on file       Family History   Problem Relation Age of Onset    Hypertension Mother    Connye Sole Arthritis Mother     Coronary Art Dis Father         s/p CABG x6       Review of Systems  Constitutional: Negative for fever, malaise/fatigue and weight loss. HENT: Negative for sore throat and tinnitus. Eyes: Negative for blurred vision and double vision. Respiratory: Negative for shortness of breath. Negative for cough and wheezing. Cardiovascular: As mentioned in HPI. Gastrointestinal: Negative for abdominal pain, heartburn, nausea and vomiting. Genitourinary: Negative. Musculoskeletal: Negative for back pain, joint pain and myalgias. Neurological: Negative for dizziness, tremors, loss of consciousness and headaches. Endo/Heme/Allergies: Negative. Psychiatric/Behavioral: Negative for depression and suicidal ideas. Physical Exam   /70 (Site: Right Upper Arm, Position: Sitting, Cuff Size: Large Adult)   Pulse 69   Resp 16   Ht 5' 10\" (1.778 m)   Wt (!) 317 lb 12.8 oz (144.2 kg)   SpO2 97%   BMI 45.60 kg/m²   Constitutional: Oriented to person, place, and time. Well-developed and well-nourished. No distress. Head: Normocephalic and atraumatic. Eyes: EOM are normal. Pupils are equal, round, and reactive to light. Neck: Normal range of motion. Neck supple. JVP is difficult to assess. Carotid bruit is not present. No tracheal deviation present. No thyromegaly present.    Cardiovascular: Normal rate, regular rhythm, normal heart sounds and intact distal pulses. Exam reveals no gallop and no friction rub. No murmur heard. Pulmonary/Chest: Effort normal and breath sounds normal. No respiratory distress. No wheezes. No rales. No tenderness. Abdominal: Soft. Bowel sounds are normal. No distension and no mass. No tenderness. No rebound and no guarding. Musculoskeletal: Normal range of motion. 1+ edema and no tenderness. Lymphadenopathy:   No cervical adenopathy. Neurological: Alert and oriented to person, place, and time. Skin: Skin is warm and dry. No rash noted. Not diaphoretic. No erythema. Psychiatric: Normal mood and affect. Behavior is normal.     Procedures and Testing  EKG: Done in the office today and reviewed by me. Normal sinus rhythm. ASSESSMENT:   Diagnosis Orders   1. Chest pain, unspecified type  EKG 12 Lead   2. Atypical angina (HCC)  ECHO Complete 2D W Doppler W Color    NM Cardiac Stress Test Nuclear Imaging    Cardiac Stress Test Exercise- Treadmill   3. Essential hypertension     4. Acute decompensated heart failure [facility])  Cardiac Stress Test Exercise- Treadmill   5. Morbid obesity ([address]     6. Mixed hyperlipidemia          Plan:  1. Atypical angina, dyspnea on exertion: Volume status is difficult to assess. He does have significant risk factors. His symptoms are both typical and atypical in nature. Given these findings, I will schedule him for an exercise nuclear perfusion scan to look for objective signs of ischemia. I will also check an echocardiogram to assess biventricular function and rule out any significant valvular abnormalities. I will do a trial of Lasix 20 mg daily. He will call me at the end of the week with a symptom and weight update. Heart failure education provided. 2.  Morbid obesity: Counseled about compliance with diet, exercise and weight loss. 3.  Obstructive sleep apnea: He is compliant with CPAP. 4.  Hyperlipidemia: He is on Lipitor 40 mg daily.   He had a lipid panel done recently and I will have that faxed to my office for review. Follow-up with me in 3 months. Ramin Leach MD  [facility]) Cardiology     NOTE: This report was transcribed using voice recognition software. Every effort was made to ensure accuracy; however, inadvertent computerized transcription errors may be present.

## 2022-05-18 ENCOUNTER — TELEPHONE (OUTPATIENT)
Dept: CARDIOLOGY | Age: 52
End: 2022-05-18

## 2022-05-18 NOTE — TELEPHONE ENCOUNTER
Spoke with patient,confirmed stress test appointment on May 20,2022 at 10 Reji Rosenberg. Instructions for test given NPO after midnight. No caffeine 12 hours prior to the test and bring inhaler to the test Pre-procedure COVID checklist reviewed.

## 2022-05-23 ENCOUNTER — TELEPHONE (OUTPATIENT)
Dept: CARDIOLOGY | Age: 52
End: 2022-05-23

## 2022-05-23 NOTE — TELEPHONE ENCOUNTER
Per [employer]- Nuclear Stress scheduled for 05/25/22 was denied. I had to call [facility] for denial reason. Per Isabellamelba Cordovalle  case #[id number]- the medical determination was 1 of the following- EKG finding not enough blood flow to heart; inability to not be able to walk Treadmill not due to Orthopedic; ETT-  H/o of abnormal EKG results not due to CAD. Can patient be scheduled for a Regular Treadmill?   Thank you  Electronically signed by Rodrigo Murrieta on 5/23/2022 at 2:43 PM

## 2022-05-24 ENCOUNTER — TELEPHONE (OUTPATIENT)
Dept: CARDIOLOGY | Age: 52
End: 2022-05-24

## 2022-05-24 NOTE — TELEPHONE ENCOUNTER
Talked to Robina martinez. She will check with Dr Joesph Hayden as to whether a regular treadmill will be ordered for tomorrow, as the nuclear tread denied.  Thank you  Electronically signed by Anup Nelson on 5/24/2022 at 9:04 AM

## 2022-05-25 ENCOUNTER — HOSPITAL ENCOUNTER (OUTPATIENT)
Dept: CARDIOLOGY | Age: 52
Discharge: HOME | End: 2022-05-25
Payer: COMMERCIAL

## 2022-05-25 VITALS
WEIGHT: 315 LBS | OXYGEN SATURATION: 97 % | DIASTOLIC BLOOD PRESSURE: 83 MMHG | BODY MASS INDEX: 45.1 KG/M2 | HEIGHT: 70 IN | SYSTOLIC BLOOD PRESSURE: 142 MMHG | HEART RATE: 68 BPM

## 2022-05-25 PROCEDURE — 93018 CV STRESS TEST I&R ONLY: CPT | Performed by: INTERNAL MEDICINE

## 2022-05-25 PROCEDURE — 93016 CV STRESS TEST SUPVJ ONLY: CPT | Performed by: INTERNAL MEDICINE

## 2022-05-25 PROCEDURE — 78452 HT MUSCLE IMAGE SPECT MULT: CPT | Performed by: INTERNAL MEDICINE

## 2022-05-25 PROCEDURE — 93017 CV STRESS TEST TRACING ONLY: CPT

## 2022-05-25 RX ORDER — HYDROXYZINE HYDROCHLORIDE 25 MG/1
25 TABLET, FILM COATED ORAL EVERY 8 HOURS PRN
COMMUNITY
Start: 2022-05-16

## 2022-05-25 NOTE — PROCEDURES
[address] [address] [facility] - [address] [place]AR[place] herbert,  [facility]  [phone]    Exercise Stress Study       Name: [address] Account Number:  [de-identified]      :  1970          Sex: male         Date of Study:  2022    Height: 5' 10\" (177.8 cm)          Weight: (!) 317 lb (143.8 kg)    Ordering Provider: Mona Hernandez MD          PCP: Romeo Blood DO    Cardiologist: Mona Hernandez MD              Interpreting Physician: Shira Vela MD    Indication: Dyspnea on exertion. Clinical History:   Patient has no known history of coronary artery disease. Resting ECG: Sinus rhythm at 70 bpm, left axis deviation and diffuse low voltage. Exercise: The patient exercised using a Magnus protocol, completing 5:05 minutes and reaching an estimated work load of 7.0 metabolic equivalents (METS). Resting HR was 70. Peak exercise heart rate was 145 ( 86% of maximum predicted heart rate for age). Baseline /83. Peak exercise /80. The blood pressure response to exercise was normal      Exercise was terminated due to dyspnea, fatigue and heart rate attained. The patient experienced sternal \"pressure\" of a 4 on treadmill with exercise. He stated in recovery that he had jaw pressure also but did not report when on the treadmill. All symptoms subsided at end of recovery. Pulse oximetry was used to monitor oxygen saturation during the stress test.  The study was performed on Room [employer]. The resting pulse oximeter was 97%. The post stress O2 saturation seen during exercise was 94 %. Exercise ECG:   EKG during exercise and recovery revealed no ischemia or arrhythmia. Impression:    1. Negative exercise stress test by EKG criteria. 2. Exercise capacity was below average. Thank you for sending your patient to this [employer].     Electronically signed by Kayden Dean MD on 2022 at 1:13 PM

## 2022-05-31 ENCOUNTER — TELEPHONE (OUTPATIENT)
Dept: CARDIOLOGY CLINIC | Age: 52
End: 2022-05-31

## 2022-05-31 NOTE — TELEPHONE ENCOUNTER
----- Message from Asim Mcclendon MD sent at 5/30/2022 12:14 PM EDT -----  His stress results are within normal limits.

## 2022-08-22 ENCOUNTER — OFFICE VISIT (OUTPATIENT)
Dept: CARDIOLOGY CLINIC | Age: 52
End: 2022-08-22
Payer: COMMERCIAL

## 2022-08-22 ENCOUNTER — HOSPITAL ENCOUNTER (OUTPATIENT)
Dept: CARDIOLOGY | Age: 52
Discharge: HOME | End: 2022-08-22
Payer: COMMERCIAL

## 2022-08-22 VITALS
BODY MASS INDEX: 45.1 KG/M2 | RESPIRATION RATE: 16 BRPM | WEIGHT: 315 LBS | SYSTOLIC BLOOD PRESSURE: 118 MMHG | DIASTOLIC BLOOD PRESSURE: 80 MMHG | HEIGHT: 70 IN | HEART RATE: 63 BPM

## 2022-08-22 DIAGNOSIS — I10 ESSENTIAL HYPERTENSION: ICD-10-CM

## 2022-08-22 DIAGNOSIS — I42.8 NICM (NONISCHEMIC CARDIOMYOPATHY) (HCC): Primary | ICD-10-CM

## 2022-08-22 DIAGNOSIS — E66.01 MORBID OBESITY (HCC): ICD-10-CM

## 2022-08-22 DIAGNOSIS — R07.9 CHEST PAIN, UNSPECIFIED TYPE: ICD-10-CM

## 2022-08-22 DIAGNOSIS — I20.8 ATYPICAL ANGINA (HCC): ICD-10-CM

## 2022-08-22 LAB
LV EF: 45 %
LVEF MODALITY: NORMAL

## 2022-08-22 PROCEDURE — 6360000004 HC RX CONTRAST MEDICATION: Performed by: INTERNAL MEDICINE

## 2022-08-22 PROCEDURE — 93000 ELECTROCARDIOGRAM COMPLETE: CPT | Performed by: INTERNAL MEDICINE

## 2022-08-22 PROCEDURE — 93306 TTE W/DOPPLER COMPLETE: CPT

## 2022-08-22 PROCEDURE — 99214 OFFICE O/P EST MOD 30 MIN: CPT | Performed by: INTERNAL MEDICINE

## 2022-08-22 PROCEDURE — C8929 TTE W OR WO FOL WCON,DOPPLER: HCPCS

## 2022-08-22 PROCEDURE — 2580000003 HC RX 258: Performed by: INTERNAL MEDICINE

## 2022-08-22 RX ORDER — SODIUM CHLORIDE 0.9 % (FLUSH) 0.9 %
10 SYRINGE (ML) INJECTION 2 TIMES DAILY
Status: DISCONTINUED | OUTPATIENT
Start: 2022-08-22 | End: 2022-08-23 | Stop reason: HOSPADM

## 2022-08-22 RX ADMIN — PERFLUTREN 1.1 MG: 6.52 INJECTION, SUSPENSION INTRAVENOUS at 10:40

## 2022-08-22 RX ADMIN — SODIUM CHLORIDE, PRESERVATIVE FREE 10 ML: 5 INJECTION INTRAVENOUS at 10:40

## 2022-08-22 RX ADMIN — SODIUM CHLORIDE, PRESERVATIVE FREE 10 ML: 5 INJECTION INTRAVENOUS at 10:47

## 2022-08-22 NOTE — PATIENT INSTRUCTIONS
Continue all your medications at current doses. Start taking entresto 24-26 mg twice daily. We will call you next week about starting metoprolol. Check BMP in 1 week. We will schedule an echocardiogram.   Restrict sodium intake to less than 2-2.5 g/day. Restrict fluid intake to less than 2.2 L/day. Goal BP is less than 130/80. Please try to exercise for 150 minutes a week. I will see you back in the office in 6 months. Please call the office at ([phone], option 2) if you have any questions.

## 2022-08-22 NOTE — PROGRESS NOTES
CHIEF COMPLAINT:   Chief Complaint   Patient presents with    Chest Pain     OV. Patient complains of a little swelling in ankles and chest pain today. Yesterday SOB while moving around a lot. HISTORY OF PRESENT ILLNESS: Patient is a 46 y.o. male who is here for a follow-up visit with me. He has a history of morbid obesity, hyperlipidemia, BPH, no prior significant cardiac history. He was referred to me for further evaluation of the complaints of atypical angina and dyspnea on exertion. He underwent an echocardiogram that was done today and an exercise treadmill stress test.  Both of these are summarized below. The stress test was initially ordered as an exercise nuclear perfusion scan but was not covered by insurance. Since then, his chest pain appears to have resolved. He still has dyspnea on more than moderate exertion. His symptoms have been stable. At today's office visit, He denies any palpitations, dizziness, pedal edema. The patient is capable of activities of daily living. There is dyspnea on more than moderate exertion. There is no orthopnea or PND. He is compliant with medications. His father does have coronary artery disease and had bypass in his 46s. Prior cardiac work-up:  Echocardiogram from 8/22/2022: EF is 45%. Normal RV function. Grade 1 diastolic dysfunction. Unable to estimate PA pressure. Technically difficult study. Exercise treadmill test from 5/25/2022: Exercise time 5 minutes 5 seconds. 7 METS.   Negative for ischemia        Past Medical History:   Diagnosis Date    Asthma     HLD (hyperlipidemia)        Allergies   Allergen Reactions    Neosporin Plus Max [facility]       Current Outpatient Medications   Medication Sig Dispense Refill    sacubitril-valsartan (ENTRESTO) 24-26 MG per tablet Take 1 tablet by mouth 2 times daily 60 tablet 3    hydrOXYzine (ATARAX) 25 MG tablet Take 25 mg by mouth every 8 hours as needed       albuterol sulfate (PROAIR RESPICLICK) 147 (90 Base) MCG/ACT aerosol powder inhalation Inhale into the lungs every 4 hours as needed for Wheezing or Shortness of Breath      Multiple Vitamins-Minerals (CENTRUM SILVER 50+MEN PO) Take by mouth daily      magnesium (MAGNESIUM-OXIDE) 250 MG TABS tablet Take 500 mg by mouth daily      Omega-3 Fatty Acids (FISH OIL) 1200 MG CPDR Take by mouth daily      fluticasone (FLONASE) 50 MCG/ACT nasal spray 1 spray by Each Nostril route daily      cetirizine (ZYRTEC) 5 MG tablet Take 10 mg by mouth daily       montelukast (SINGULAIR) 10 MG tablet Take 10 mg by mouth nightly      tamsulosin (FLOMAX) 0.4 MG capsule Take 0.8 mg by mouth daily      atorvastatin (LIPITOR) 40 MG tablet Take 40 mg by mouth daily      furosemide (LASIX) 20 MG tablet Take 1 tablet by mouth daily Take daily for the next 5 days and call me with an update. (Patient not taking: Reported on 8/22/2022) 30 tablet 1     No current facility-administered medications for this visit.        Social History     Socioeconomic History    Marital status: Single     Spouse name: Not on file    Number of children: Not on file    Years of education: Not on file    Highest education level: Not on file   Occupational History    Not on file   Tobacco Use    Smoking status: Never    Smokeless tobacco: Never   Vaping Use    Vaping Use: Never used   Substance and Sexual Activity    Alcohol use: Yes     Comment: occ beer    Drug use: Never    Sexual activity: Not on file   Other Topics Concern    Not on file   Social History Narrative    Drinks 1-2 cups of decaf coffee daily     Social Determinants of Health     Financial Resource Strain: Not on file   Food Insecurity: Not on file   Transportation Needs: Not on file   Physical Activity: Not on file   Stress: Not on file   Social Connections: Not on file   Intimate Partner Violence: Not on file   Housing Stability: Not on file       Family History   Problem Relation Age of Onset    Hypertension Mother Arthritis Mother     Coronary Art Dis Father         s/p CABG x6       Review of Systems  Constitutional: Negative for fever, malaise/fatigue and weight loss. HENT: Negative for sore throat and tinnitus. Eyes: Negative for blurred vision and double vision. Respiratory: Negative for shortness of breath. Negative for cough and wheezing. Cardiovascular: As mentioned in HPI. Gastrointestinal: Negative for abdominal pain, heartburn, nausea and vomiting. Genitourinary: Negative. Musculoskeletal: Negative for back pain, joint pain and myalgias. Neurological: Negative for dizziness, tremors, loss of consciousness and headaches. Endo/Heme/Allergies: Negative. Psychiatric/Behavioral: Negative for depression and suicidal ideas. Physical Exam   /80   Pulse 63   Resp 16   Ht 5' 10\" (1.778 m)   Wt (!) 318 lb 6.4 oz (144.4 kg)   BMI 45.69 kg/m²   Constitutional: Oriented to person, place, and time. Well-developed and well-nourished. No distress. Head: Normocephalic and atraumatic. Eyes: EOM are normal. Pupils are equal, round, and reactive to light. Neck: Normal range of motion. Neck supple. JVP is difficult to assess. Carotid bruit is not present. No tracheal deviation present. No thyromegaly present. Cardiovascular: Normal rate, regular rhythm, normal heart sounds and intact distal pulses. Exam reveals no gallop and no friction rub. No murmur heard. Pulmonary/Chest: Effort normal and breath sounds normal. No respiratory distress. No wheezes. No rales. No tenderness. Abdominal: Soft. Bowel sounds are normal. No distension and no mass. No tenderness. No rebound and no guarding. Musculoskeletal: Normal range of motion. 1+ edema and no tenderness. Lymphadenopathy:   No cervical adenopathy. Neurological: Alert and oriented to person, place, and time. Skin: Skin is warm and dry. No rash noted. Not diaphoretic. No erythema. Psychiatric: Normal mood and affect.  Behavior is normal.     Procedures and Testing  EKG: Done in the office today and reviewed by me. Normal sinus rhythm. Low voltage in the precordial leads. ASSESSMENT:   Diagnosis Orders   1. NICM (nonischemic cardiomyopathy) (HCC)  ECHO Complete 2D W Doppler W Color      2. Chest pain, unspecified type  EKG 12 Lead      3. Essential hypertension  Basic Metabolic Panel      4. Morbid obesity ([address]             Plan:  1. Atypical angina, dyspnea on exertion, new onset systolic dysfunction: Echocardiogram from today as above. He did not have any response to Lasix that I had initiated at the last visit. He underwent an exercise treadmill test that was negative for ischemia. He had below average functional capacity. I would ideally like to proceed with further ischemic evaluation. I will try to see if he can be accommodated for a CTA of the coronaries. He appears euvolemic on examination today. I will initiate Entresto 2426 twice daily. If he tolerates it well, I will initiate metoprolol next week. Heart failure education as well as salt reduction counseling provided. 2.  Morbid obesity: Counseled about compliance with diet, exercise and weight loss. I discussed bariatric surgery referral with him. He is unwilling at this time. He wants to try dietary and weight loss measures first.  He does not want referral to a nutritionist at this time either. 3.  Obstructive sleep apnea: He is compliant with CPAP. 4.  Hyperlipidemia: He is on Lipitor 40 mg daily. He had a lipid panel done recently and I will have that faxed to my office for review. Follow-up with me in 6 months. Kervin Marcus MD  [facility]) Cardiology     NOTE: This report was transcribed using voice recognition software. Every effort was made to ensure accuracy; however, inadvertent computerized transcription errors may be present.

## 2022-08-31 ENCOUNTER — HOSPITAL ENCOUNTER (OUTPATIENT)
Age: 52
Discharge: HOME | End: 2022-08-31
Payer: COMMERCIAL

## 2022-08-31 DIAGNOSIS — I10 ESSENTIAL HYPERTENSION: ICD-10-CM

## 2022-08-31 LAB
ANION GAP SERPL CALCULATED.3IONS-SCNC: 12 MMOL/L (ref 7–16)
BUN BLDV-MCNC: 13 MG/DL (ref 6–20)
CALCIUM SERPL-MCNC: 9.4 MG/DL (ref 8.6–10.2)
CHLORIDE BLD-SCNC: 100 MMOL/L (ref 98–107)
CO2: 29 MMOL/L (ref 22–29)
CREAT SERPL-MCNC: 0.9 MG/DL (ref 0.7–1.2)
GFR AFRICAN AMERICAN: >60
GFR NON-AFRICAN AMERICAN: >60 ML/MIN/1.73
GLUCOSE BLD-MCNC: 102 MG/DL (ref 74–99)
POTASSIUM SERPL-SCNC: 4.2 MMOL/L (ref 3.5–5)
SODIUM BLD-SCNC: 141 MMOL/L (ref 132–146)

## 2022-08-31 PROCEDURE — 80048 BASIC METABOLIC PNL TOTAL CA: CPT

## 2022-08-31 PROCEDURE — 36415 COLL VENOUS BLD VENIPUNCTURE: CPT

## 2023-10-30 ENCOUNTER — TELEPHONE (OUTPATIENT)
Dept: CARDIOLOGY CLINIC | Age: 53
End: 2023-10-30

## 2023-10-30 NOTE — TELEPHONE ENCOUNTER
PCP called to get appointment stating patient has been having chest pain for the last year. Patient is overdue for OV.     Offered this Thursday November 3rd  patient refused next available appointment  given

## 2023-11-28 ENCOUNTER — OFFICE VISIT (OUTPATIENT)
Dept: CARDIOLOGY CLINIC | Age: 53
End: 2023-11-28
Payer: COMMERCIAL

## 2023-11-28 VITALS
HEART RATE: 75 BPM | BODY MASS INDEX: 45.1 KG/M2 | DIASTOLIC BLOOD PRESSURE: 68 MMHG | WEIGHT: 315 LBS | SYSTOLIC BLOOD PRESSURE: 114 MMHG | HEIGHT: 70 IN

## 2023-11-28 DIAGNOSIS — I50.9 ACUTE DECOMPENSATED HEART FAILURE (HCC): ICD-10-CM

## 2023-11-28 DIAGNOSIS — R06.09 DOE (DYSPNEA ON EXERTION): ICD-10-CM

## 2023-11-28 DIAGNOSIS — I10 ESSENTIAL HYPERTENSION: ICD-10-CM

## 2023-11-28 DIAGNOSIS — E78.2 MIXED HYPERLIPIDEMIA: ICD-10-CM

## 2023-11-28 DIAGNOSIS — R07.89 OTHER CHEST PAIN: ICD-10-CM

## 2023-11-28 DIAGNOSIS — E66.01 MORBID OBESITY (HCC): ICD-10-CM

## 2023-11-28 DIAGNOSIS — I42.8 NICM (NONISCHEMIC CARDIOMYOPATHY) (HCC): Primary | ICD-10-CM

## 2023-11-28 PROCEDURE — 93000 ELECTROCARDIOGRAM COMPLETE: CPT | Performed by: INTERNAL MEDICINE

## 2023-11-28 PROCEDURE — 3074F SYST BP LT 130 MM HG: CPT | Performed by: INTERNAL MEDICINE

## 2023-11-28 PROCEDURE — 3078F DIAST BP <80 MM HG: CPT | Performed by: INTERNAL MEDICINE

## 2023-11-28 PROCEDURE — 99214 OFFICE O/P EST MOD 30 MIN: CPT | Performed by: INTERNAL MEDICINE

## 2023-11-28 RX ORDER — TORSEMIDE 20 MG/1
20 TABLET ORAL DAILY
Qty: 30 TABLET | Refills: 3 | Status: SHIPPED | OUTPATIENT
Start: 2023-11-28

## 2023-11-28 RX ORDER — SEMAGLUTIDE 0.25 MG/.5ML
0.25 INJECTION, SOLUTION SUBCUTANEOUS
Qty: 10 ML | Refills: 1 | Status: SHIPPED | OUTPATIENT
Start: 2023-11-28

## 2023-11-28 NOTE — PATIENT INSTRUCTIONS
Continue all your medications at current doses. We will get your labs for review. We will schedule an echocardiogram.   Take demadex 20 mg daily for 3 days. Call with an update at the end of the week. Use as needed thereafter. Start wegovy 0.25 mg weekly. Restrict sodium intake to less than 2-2.5 g/day. Restrict fluid intake to less than 2.2 L/day. Goal BP is less than 130/80. If your weight increases by > 2 lbs in a day or >5 lbs in more than a day, take an extra lasix pill. Please try to exercise for 150 minutes a week. I will see you back in the office in 6 months. Please call the office at ([phone], option 2) if you have any questions.

## 2023-11-28 NOTE — PROGRESS NOTES
CHIEF COMPLAINT:   Chief Complaint   Patient presents with    Chest Pain        HISTORY OF PRESENT ILLNESS: Patient is a 48 y.o. male who is here for a follow-up visit with me. He has a history of morbid obesity, hyperlipidemia, BPH, heart failure with reduced EF-likely nonischemic cardiomyopathy. He was last seen by me in the office more than a year ago. Since then, he has not had any visits to the hospital or emergency room. He has been trying to exercise on a stationary bike. In the last month, he has noted chest tightness with more than moderate exertion. He also complains of associated dyspnea. No palpitations. No dizziness or falls. He underwent an echocardiogram that last year and an exercise treadmill stress test.  Both of these are summarized below. The stress test was initially ordered as an exercise nuclear perfusion scan but was not covered by insurance. At today's office visit, He denies any palpitations, dizziness, pedal edema. The patient is capable of activities of daily living. There is dyspnea on more than moderate exertion. There is no orthopnea or PND. He is compliant with medications. His father does have coronary artery disease and had bypass in his 46s. He admits to some dietary indiscretion. He has had some weight gain since his last visit with me. Prior cardiac work-up:  Echocardiogram from 8/22/2022: EF is 45%. Normal RV function. Grade 1 diastolic dysfunction. Unable to estimate PA pressure. Technically difficult study. Exercise treadmill test from 5/25/2022: Exercise time 5 minutes 5 seconds. 7 METS.   Negative for ischemia        Past Medical History:   Diagnosis Date    Asthma     HLD (hyperlipidemia)        Allergies   Allergen Reactions    Delfino-Bacit-Poly-Lidocaine     Other      [facility]       Current Outpatient Medications   Medication Sig Dispense Refill    Semaglutide-Weight Management (WEGOVY) 0.25 MG/0.5ML SOAJ SC injection Inject 0.25 mg into the

## 2023-12-04 ENCOUNTER — TELEPHONE (OUTPATIENT)
Dept: CARDIOLOGY CLINIC | Age: 53
End: 2023-12-04

## 2023-12-04 DIAGNOSIS — I42.8 NONISCHEMIC CARDIOMYOPATHY (HCC): ICD-10-CM

## 2023-12-04 DIAGNOSIS — I10 ESSENTIAL HYPERTENSION: Primary | ICD-10-CM

## 2023-12-13 ENCOUNTER — HOSPITAL ENCOUNTER (OUTPATIENT)
Age: 53
Discharge: HOME | End: 2023-12-13
Payer: COMMERCIAL

## 2023-12-13 DIAGNOSIS — I42.8 NONISCHEMIC CARDIOMYOPATHY (HCC): ICD-10-CM

## 2023-12-13 DIAGNOSIS — I10 ESSENTIAL HYPERTENSION: ICD-10-CM

## 2023-12-13 LAB
ANION GAP SERPL CALCULATED.3IONS-SCNC: 14 MMOL/L (ref 7–16)
BUN SERPL-MCNC: 12 MG/DL (ref 6–20)
CALCIUM SERPL-MCNC: 8.8 MG/DL (ref 8.6–10.2)
CHLORIDE SERPL-SCNC: 99 MMOL/L (ref 98–107)
CO2 SERPL-SCNC: 25 MMOL/L (ref 22–29)
CREAT SERPL-MCNC: 0.8 MG/DL (ref 0.7–1.2)
GFR SERPL CREATININE-BSD FRML MDRD: >60 ML/MIN/1.73M2
GLUCOSE SERPL-MCNC: 89 MG/DL (ref 74–99)
POTASSIUM SERPL-SCNC: 3.8 MMOL/L (ref 3.5–5)
SODIUM SERPL-SCNC: 138 MMOL/L (ref 132–146)

## 2023-12-13 PROCEDURE — 80048 BASIC METABOLIC PNL TOTAL CA: CPT

## 2023-12-19 ENCOUNTER — TELEPHONE (OUTPATIENT)
Dept: CARDIOLOGY CLINIC | Age: 53
End: 2023-12-19

## 2023-12-27 RX ORDER — SACUBITRIL AND VALSARTAN 24; 26 MG/1; MG/1
1 TABLET, FILM COATED ORAL 2 TIMES DAILY
Qty: 180 TABLET | Refills: 0 | OUTPATIENT
Start: 2023-12-27

## 2024-02-21 ENCOUNTER — HOSPITAL ENCOUNTER (OUTPATIENT)
Dept: CARDIOLOGY | Age: 54
Discharge: HOME | End: 2024-02-23
Payer: COMMERCIAL

## 2024-02-21 VITALS
DIASTOLIC BLOOD PRESSURE: 68 MMHG | SYSTOLIC BLOOD PRESSURE: 114 MMHG | BODY MASS INDEX: 44.09 KG/M2 | WEIGHT: 308 LBS | HEIGHT: 70 IN

## 2024-02-21 DIAGNOSIS — R06.09 DOE (DYSPNEA ON EXERTION): ICD-10-CM

## 2024-02-21 LAB
ECHO AV AREA PEAK VELOCITY: 2.7 CM2
ECHO AV AREA VTI: 2.7 CM2
ECHO AV AREA/BSA PEAK VELOCITY: 1.1 CM2/M2
ECHO AV AREA/BSA VTI: 1.1 CM2/M2
ECHO AV CUSP MM: 2 CM
ECHO AV MEAN GRADIENT: 3 MMHG
ECHO AV MEAN VELOCITY: 0.9 M/S
ECHO AV PEAK GRADIENT: 7 MMHG
ECHO AV PEAK VELOCITY: 1.3 M/S
ECHO AV VELOCITY RATIO: 0.69
ECHO AV VTI: 25.5 CM
ECHO BSA: 2.63 M2
ECHO EST RA PRESSURE: 3 MMHG
ECHO LA DIAMETER INDEX: 1.47 CM/M2
ECHO LA DIAMETER: 3.7 CM
ECHO LA VOL A-L A2C: 51 ML (ref 18–58)
ECHO LA VOL A-L A4C: 65 ML (ref 18–58)
ECHO LA VOL MOD A2C: 49 ML (ref 18–58)
ECHO LA VOL MOD A4C: 63 ML (ref 18–58)
ECHO LA VOLUME AREA LENGTH: 59 ML
ECHO LA VOLUME INDEX A-L A2C: 20 ML/M2 (ref 16–34)
ECHO LA VOLUME INDEX A-L A4C: 26 ML/M2 (ref 16–34)
ECHO LA VOLUME INDEX AREA LENGTH: 24 ML/M2 (ref 16–34)
ECHO LA VOLUME INDEX MOD A2C: 20 ML/M2 (ref 16–34)
ECHO LA VOLUME INDEX MOD A4C: 25 ML/M2 (ref 16–34)
ECHO LV FRACTIONAL SHORTENING: 29 % (ref 28–44)
ECHO LV INTERNAL DIMENSION DIASTOLE INDEX: 2.23 CM/M2
ECHO LV INTERNAL DIMENSION DIASTOLIC: 5.6 CM (ref 4.2–5.9)
ECHO LV INTERNAL DIMENSION SYSTOLIC INDEX: 1.59 CM/M2
ECHO LV INTERNAL DIMENSION SYSTOLIC: 4 CM
ECHO LV IVSD: 1.2 CM (ref 0.6–1)
ECHO LV MASS 2D: 296.6 G (ref 88–224)
ECHO LV MASS INDEX 2D: 118.2 G/M2 (ref 49–115)
ECHO LV POSTERIOR WALL DIASTOLIC: 1.3 CM (ref 0.6–1)
ECHO LV RELATIVE WALL THICKNESS RATIO: 0.46
ECHO LVOT AREA: 3.8 CM2
ECHO LVOT AV VTI INDEX: 0.69
ECHO LVOT DIAM: 2.2 CM
ECHO LVOT MEAN GRADIENT: 2 MMHG
ECHO LVOT PEAK GRADIENT: 3 MMHG
ECHO LVOT PEAK VELOCITY: 0.9 M/S
ECHO LVOT STROKE VOLUME INDEX: 26.6 ML/M2
ECHO LVOT SV: 66.9 ML
ECHO LVOT VTI: 17.6 CM
ECHO MV A VELOCITY: 0.71 M/S
ECHO MV AREA PHT: 3.7 CM2
ECHO MV AREA VTI: 2.4 CM2
ECHO MV E DECELERATION TIME (DT): 190.2 MS
ECHO MV E VELOCITY: 0.88 M/S
ECHO MV E/A RATIO: 1.24
ECHO MV LVOT VTI INDEX: 1.61
ECHO MV MAX VELOCITY: 0.9 M/S
ECHO MV MEAN GRADIENT: 1 MMHG
ECHO MV MEAN VELOCITY: 0.5 M/S
ECHO MV PEAK GRADIENT: 3 MMHG
ECHO MV PRESSURE HALF TIME (PHT): 59.8 MS
ECHO MV VTI: 28.3 CM
ECHO PV MAX VELOCITY: 0.8 M/S
ECHO PV MEAN GRADIENT: 1 MMHG
ECHO PV MEAN VELOCITY: 0.5 M/S
ECHO PV PEAK GRADIENT: 2 MMHG
ECHO PV VTI: 16.8 CM
ECHO RIGHT VENTRICULAR SYSTOLIC PRESSURE (RVSP): 34 MMHG
ECHO RV INTERNAL DIMENSION: 2.8 CM
ECHO TV REGURGITANT MAX VELOCITY: 2.79 M/S
ECHO TV REGURGITANT PEAK GRADIENT: 31 MMHG

## 2024-02-21 PROCEDURE — 6360000004 HC RX CONTRAST MEDICATION: Performed by: INTERNAL MEDICINE

## 2024-02-21 PROCEDURE — 2580000003 HC RX 258: Performed by: INTERNAL MEDICINE

## 2024-02-21 PROCEDURE — C8929 TTE W OR WO FOL WCON,DOPPLER: HCPCS

## 2024-02-21 RX ORDER — SODIUM CHLORIDE 0.9 % (FLUSH) 0.9 %
10 SYRINGE (ML) INJECTION PRN
Status: DISCONTINUED | OUTPATIENT
Start: 2024-02-21 | End: 2024-02-24 | Stop reason: HOSPADM

## 2024-02-21 RX ADMIN — SODIUM CHLORIDE, PRESERVATIVE FREE 10 ML: 5 INJECTION INTRAVENOUS at 11:55

## 2024-02-21 RX ADMIN — SODIUM CHLORIDE, PRESERVATIVE FREE 1.5 ML: 5 INJECTION INTRAVENOUS at 11:48

## 2024-03-07 ENCOUNTER — TELEPHONE (OUTPATIENT)
Dept: CARDIOLOGY CLINIC | Age: 54
End: 2024-03-07

## 2024-03-07 NOTE — TELEPHONE ENCOUNTER
----- Message from Joni Wan MD sent at 2/26/2024  8:17 PM EST -----  Stable echo findings. Did the demadex help?  Did the wegovy go through? If so, is it helping?

## 2024-03-07 NOTE — TELEPHONE ENCOUNTER
Patient states Wegovy is unaffordable and not covered by his insurance.     He feels demadex  is not helping as much with SOB and left leg edema since initiation

## 2024-03-08 RX ORDER — TORSEMIDE 20 MG/1
20 TABLET ORAL 2 TIMES DAILY
Qty: 30 TABLET | Refills: 3 | Status: SHIPPED | OUTPATIENT
Start: 2024-03-08

## 2024-03-20 RX ORDER — TORSEMIDE 20 MG/1
20 TABLET ORAL 2 TIMES DAILY
Qty: 180 TABLET | Refills: 1 | Status: SHIPPED | OUTPATIENT
Start: 2024-03-20

## 2024-03-27 RX ORDER — TORSEMIDE 20 MG/1
20 TABLET ORAL DAILY
Qty: 30 TABLET | Refills: 0 | OUTPATIENT
Start: 2024-03-27

## 2024-06-19 ENCOUNTER — OFFICE VISIT (OUTPATIENT)
Dept: CARDIOLOGY CLINIC | Age: 54
End: 2024-06-19
Payer: COMMERCIAL

## 2024-06-19 VITALS
HEART RATE: 66 BPM | RESPIRATION RATE: 16 BRPM | SYSTOLIC BLOOD PRESSURE: 110 MMHG | DIASTOLIC BLOOD PRESSURE: 66 MMHG | BODY MASS INDEX: 43.52 KG/M2 | HEIGHT: 70 IN | WEIGHT: 304 LBS

## 2024-06-19 DIAGNOSIS — I42.8 NICM (NONISCHEMIC CARDIOMYOPATHY) (HCC): Primary | ICD-10-CM

## 2024-06-19 DIAGNOSIS — I20.89 ATYPICAL ANGINA (HCC): ICD-10-CM

## 2024-06-19 DIAGNOSIS — I10 ESSENTIAL HYPERTENSION: ICD-10-CM

## 2024-06-19 DIAGNOSIS — E66.01 MORBID OBESITY (HCC): ICD-10-CM

## 2024-06-19 DIAGNOSIS — E78.2 MIXED HYPERLIPIDEMIA: ICD-10-CM

## 2024-06-19 PROCEDURE — 3074F SYST BP LT 130 MM HG: CPT | Performed by: INTERNAL MEDICINE

## 2024-06-19 PROCEDURE — 99214 OFFICE O/P EST MOD 30 MIN: CPT | Performed by: INTERNAL MEDICINE

## 2024-06-19 PROCEDURE — 93000 ELECTROCARDIOGRAM COMPLETE: CPT | Performed by: INTERNAL MEDICINE

## 2024-06-19 PROCEDURE — 3078F DIAST BP <80 MM HG: CPT | Performed by: INTERNAL MEDICINE

## 2024-06-19 RX ORDER — SEMAGLUTIDE 0.25 MG/.5ML
0.25 INJECTION, SOLUTION SUBCUTANEOUS
Qty: 6 ML | Refills: 1 | Status: SHIPPED | OUTPATIENT
Start: 2024-06-19

## 2024-06-19 NOTE — PROGRESS NOTES
Musculoskeletal: Negative for back pain, joint pain and myalgias.  Neurological: Negative for dizziness, tremors, loss of consciousness and headaches.  Endo/Heme/Allergies: Negative.    Psychiatric/Behavioral: Negative for depression and suicidal ideas.    Physical Exam   /66   Pulse 66   Resp 16   Ht 1.778 m (5' 10\")   Wt (!) 137.9 kg (304 lb)   BMI 43.62 kg/m²   Constitutional: Oriented to person, place, and time. Well-developed and well-nourished. No distress.    Head: Normocephalic and atraumatic.   Eyes: EOM are normal. Pupils are equal, round, and reactive to light.   Neck: Normal range of motion. Neck supple.  JVP elevated at 13 cm. Carotid bruit is not present. No tracheal deviation present. No thyromegaly present.   Cardiovascular: Normal rate, regular rhythm, normal heart sounds and intact distal pulses.  Exam reveals no gallop and no friction rub.  No murmur heard.  Pulmonary/Chest: Effort normal and breath sounds normal. No respiratory distress. No wheezes. No rales. No tenderness.   Abdominal: Soft. Bowel sounds are normal. No distension and no mass. No tenderness. No rebound and no guarding.   Musculoskeletal: Normal range of motion.  1+ edema and no tenderness.   Lymphadenopathy:   No cervical adenopathy.   Neurological: Alert and oriented to person, place, and time.   Skin: Skin is warm and dry. No rash noted. Not diaphoretic. No erythema.   Psychiatric: Normal mood and affect. Behavior is normal.     Procedures and Testing  EKG: Done in the office today and reviewed by me.  Normal sinus rhythm.  Low voltage in the precordial leads.    ASSESSMENT:   Diagnosis Orders   1. NICM (nonischemic cardiomyopathy) (HCC)  EKG 12 lead      2. Morbid obesity (HCC)        3. Mixed hyperlipidemia  Lipid Panel    Hemoglobin A1C      4. Essential hypertension        5. Atypical angina (HCC)  CTA CORON EJECT FRAC WALL MOTION           Plan:  1.  Atypical angina, dyspnea on exertion, possible nonischemic

## 2024-06-19 NOTE — PATIENT INSTRUCTIONS
Continue all your medications at current doses.   We will schedule a CTA coronary.  Start wegovy 0.25 mg weekly.   I will give you a handout for healthy diet  Restrict sodium intake to less than 2-2.5 g/day. Restrict fluid intake to less than 2.2 L/day. Goal BP is less than 130/80.   If your weight increases by > 2 lbs in a day or >5 lbs in more than a day, take an extra demadex pill.   Please try to exercise for 150 minutes a week.   I will see you back in the office in 6 months. Please call the office at ([phone], option [phone]) if you have any questions.

## 2024-07-12 DIAGNOSIS — I42.8 NICM (NONISCHEMIC CARDIOMYOPATHY) (HCC): Primary | ICD-10-CM

## 2024-07-12 DIAGNOSIS — R06.09 DOE (DYSPNEA ON EXERTION): ICD-10-CM

## 2024-07-19 ENCOUNTER — HOSPITAL ENCOUNTER (OUTPATIENT)
Age: 54
Discharge: HOME | End: 2024-07-19
Payer: COMMERCIAL

## 2024-07-19 DIAGNOSIS — E78.2 MIXED HYPERLIPIDEMIA: ICD-10-CM

## 2024-07-19 DIAGNOSIS — R06.09 DOE (DYSPNEA ON EXERTION): ICD-10-CM

## 2024-07-19 DIAGNOSIS — I42.8 NICM (NONISCHEMIC CARDIOMYOPATHY) (HCC): ICD-10-CM

## 2024-07-19 LAB
ANION GAP SERPL CALCULATED.3IONS-SCNC: 10 MMOL/L (ref 7–16)
BUN SERPL-MCNC: 11 MG/DL (ref 6–20)
CALCIUM SERPL-MCNC: 9.4 MG/DL (ref 8.6–10.2)
CHLORIDE SERPL-SCNC: 97 MMOL/L (ref 98–107)
CHOLEST SERPL-MCNC: 210 MG/DL
CO2 SERPL-SCNC: 31 MMOL/L (ref 22–29)
CREAT SERPL-MCNC: 0.9 MG/DL (ref 0.7–1.2)
GFR, ESTIMATED: >90 ML/MIN/1.73M2
GLUCOSE SERPL-MCNC: 99 MG/DL (ref 74–99)
HBA1C MFR BLD: 5.2 % (ref 4–5.6)
HDLC SERPL-MCNC: 35 MG/DL
LDLC SERPL CALC-MCNC: 147 MG/DL
POTASSIUM SERPL-SCNC: 3.8 MMOL/L (ref 3.5–5)
SODIUM SERPL-SCNC: 138 MMOL/L (ref 132–146)
TRIGL SERPL-MCNC: 140 MG/DL
VLDLC SERPL CALC-MCNC: 28 MG/DL

## 2024-07-19 PROCEDURE — 83036 HEMOGLOBIN GLYCOSYLATED A1C: CPT

## 2024-07-19 PROCEDURE — 36415 COLL VENOUS BLD VENIPUNCTURE: CPT

## 2024-07-19 PROCEDURE — 80061 LIPID PANEL: CPT

## 2024-07-19 PROCEDURE — 80048 BASIC METABOLIC PNL TOTAL CA: CPT

## 2024-07-23 ENCOUNTER — TELEPHONE (OUTPATIENT)
Dept: CARDIOLOGY CLINIC | Age: 54
End: 2024-07-23

## 2024-07-23 NOTE — TELEPHONE ENCOUNTER
----- Message from Pat Morgan sent at 7/22/2024  8:56 AM EDT -----  NO Auth Required  ----- Message -----  From: Mimi Jay MA  Sent: 7/19/2024   1:37 PM EDT  To: Pat Morgan; EMEKA Mcgregor :) please authorize     CTA Coron EF     CPT :05547    DX: I20.89     [id number]    [facility] ordering NPI [id number]

## 2024-07-25 ENCOUNTER — TELEPHONE (OUTPATIENT)
Dept: CARDIOLOGY CLINIC | Age: 54
End: 2024-07-25

## 2024-08-02 ENCOUNTER — HOSPITAL ENCOUNTER (OUTPATIENT)
Dept: CT IMAGING | Age: 54
Discharge: HOME | End: 2024-08-02
Attending: INTERNAL MEDICINE
Payer: COMMERCIAL

## 2024-08-02 VITALS
RESPIRATION RATE: 16 BRPM | BODY MASS INDEX: 42.95 KG/M2 | OXYGEN SATURATION: 98 % | DIASTOLIC BLOOD PRESSURE: 65 MMHG | HEIGHT: 70 IN | WEIGHT: 300 LBS | HEART RATE: 63 BPM | SYSTOLIC BLOOD PRESSURE: 104 MMHG | TEMPERATURE: 97.7 F

## 2024-08-02 DIAGNOSIS — I20.89 ATYPICAL ANGINA (HCC): ICD-10-CM

## 2024-08-02 PROCEDURE — 6370000000 HC RX 637 (ALT 250 FOR IP): Performed by: INTERNAL MEDICINE

## 2024-08-02 PROCEDURE — 75571 CT HRT W/O DYE W/CA TEST: CPT

## 2024-08-02 PROCEDURE — 2580000003 HC RX 258: Performed by: INTERNAL MEDICINE

## 2024-08-02 PROCEDURE — 6360000004 HC RX CONTRAST MEDICATION: Performed by: RADIOLOGY

## 2024-08-02 RX ORDER — SODIUM CHLORIDE 0.9 % (FLUSH) 0.9 %
10 SYRINGE (ML) INJECTION
Status: ACTIVE | OUTPATIENT
Start: 2024-08-02 | End: 2024-08-03

## 2024-08-02 RX ORDER — NITROGLYCERIN 0.4 MG/1
0.8 TABLET SUBLINGUAL ONCE
Status: ACTIVE | OUTPATIENT
Start: 2024-08-02

## 2024-08-02 RX ORDER — 0.9 % SODIUM CHLORIDE 0.9 %
1000 INTRAVENOUS SOLUTION INTRAVENOUS ONCE
Status: COMPLETED | OUTPATIENT
Start: 2024-08-02 | End: 2024-08-02

## 2024-08-02 RX ORDER — METOPROLOL TARTRATE 50 MG/1
100 TABLET, FILM COATED ORAL ONCE
Status: COMPLETED | OUTPATIENT
Start: 2024-08-02 | End: 2024-08-02

## 2024-08-02 RX ADMIN — METOPROLOL TARTRATE 100 MG: 50 TABLET, FILM COATED ORAL at 08:04

## 2024-08-02 RX ADMIN — SODIUM CHLORIDE 1000 ML: 9 INJECTION, SOLUTION INTRAVENOUS at 08:05

## 2024-08-02 RX ADMIN — IVABRADINE 15 MG: 5 TABLET, FILM COATED ORAL at 09:01

## 2024-08-02 ASSESSMENT — PAIN - FUNCTIONAL ASSESSMENT: PAIN_FUNCTIONAL_ASSESSMENT: NONE - DENIES PAIN

## 2024-08-02 NOTE — PROCEDURES
1038 Pt return from coronary cta. Denies any CP, SOB or any chest tightness at this time. Explained to patient exam could not be completed due to a high calcium score. Patient stated understanding and all questions answered. D/C to home with instructions to follow up with ordering physician.    7229 Notified by Ree in CT the final calcium score was 5301.9 and per Dr. Mon patient is to report to ER if experiencing any chest pain. Notified patient of these instructions and reviewed signs and symptoms of heart attack and stroke, printed on AVS.     [phone] Fax sent to Dr. Wan notifying of cancelled exam due to calcium score of 5301.9.

## 2024-08-02 NOTE — PROCEDURES
0720 Patient arrived for CTA coronary arteries. Allergies reviewed, instructions given to  include protocol for heart rate, b/p, necessary medications that will be given, IV site and proper breath hold during scan. Questions answered and expressed understanding confirmed. Placed on monitoring devices for heart rate and rhythm, B/P taken, IV started, flushed and prn adapter attached.      0756 Dr Mon notified of patient arrival, history, test/lab results, home medications, vital signs and rhythm. Orders received.

## 2024-08-07 DIAGNOSIS — Z01.818 PRE-OP EVALUATION: Primary | ICD-10-CM

## 2024-08-07 DIAGNOSIS — I50.9 HEART FAILURE (HCC): Primary | ICD-10-CM

## 2024-08-12 ENCOUNTER — HOSPITAL ENCOUNTER (OUTPATIENT)
Age: 54
Discharge: HOME | End: 2024-08-12
Payer: COMMERCIAL

## 2024-08-12 DIAGNOSIS — Z01.818 PRE-OP EVALUATION: ICD-10-CM

## 2024-08-12 LAB
ABO + RH BLD: NORMAL
ANION GAP SERPL CALCULATED.3IONS-SCNC: 11 MMOL/L (ref 7–16)
ARM BAND NUMBER: NORMAL
BASOPHILS # BLD: 0.03 K/UL (ref 0–0.2)
BASOPHILS NFR BLD: 0 % (ref 0–2)
BLOOD BANK SAMPLE EXPIRATION: NORMAL
BLOOD GROUP ANTIBODIES SERPL: NEGATIVE
BUN SERPL-MCNC: 11 MG/DL (ref 6–20)
CALCIUM SERPL-MCNC: 9.4 MG/DL (ref 8.6–10.2)
CHLORIDE SERPL-SCNC: 99 MMOL/L (ref 98–107)
CO2 SERPL-SCNC: 30 MMOL/L (ref 22–29)
CREAT SERPL-MCNC: 0.9 MG/DL (ref 0.7–1.2)
EOSINOPHIL # BLD: 0.21 K/UL (ref 0.05–0.5)
EOSINOPHILS RELATIVE PERCENT: 3 % (ref 0–6)
ERYTHROCYTE [DISTWIDTH] IN BLOOD BY AUTOMATED COUNT: 12.3 % (ref 11.5–15)
GFR, ESTIMATED: >90 ML/MIN/1.73M2
GLUCOSE SERPL-MCNC: 104 MG/DL (ref 74–99)
HCT VFR BLD AUTO: 40.6 % (ref 37–54)
HGB BLD-MCNC: 13.8 G/DL (ref 12.5–16.5)
IMM GRANULOCYTES # BLD AUTO: 0.04 K/UL (ref 0–0.58)
IMM GRANULOCYTES NFR BLD: 1 % (ref 0–5)
INR PPP: 1.1
LYMPHOCYTES NFR BLD: 2.22 K/UL (ref 1.5–4)
LYMPHOCYTES RELATIVE PERCENT: 27 % (ref 20–42)
MCH RBC QN AUTO: 30.3 PG (ref 26–35)
MCHC RBC AUTO-ENTMCNC: 34 G/DL (ref 32–34.5)
MCV RBC AUTO: 89 FL (ref 80–99.9)
MONOCYTES NFR BLD: 0.59 K/UL (ref 0.1–0.95)
MONOCYTES NFR BLD: 7 % (ref 2–12)
NEUTROPHILS NFR BLD: 62 % (ref 43–80)
NEUTS SEG NFR BLD: 5.08 K/UL (ref 1.8–7.3)
PARTIAL THROMBOPLASTIN TIME: 34.2 SEC (ref 24.5–35.1)
PLATELET # BLD AUTO: 236 K/UL (ref 130–450)
PMV BLD AUTO: 9.8 FL (ref 7–12)
POTASSIUM SERPL-SCNC: 3.6 MMOL/L (ref 3.5–5)
PROTHROMBIN TIME: 12.1 SEC (ref 9.3–12.4)
RBC # BLD AUTO: 4.56 M/UL (ref 3.8–5.8)
SODIUM SERPL-SCNC: 140 MMOL/L (ref 132–146)
WBC OTHER # BLD: 8.2 K/UL (ref 4.5–11.5)

## 2024-08-12 PROCEDURE — 36415 COLL VENOUS BLD VENIPUNCTURE: CPT

## 2024-08-12 PROCEDURE — 86901 BLOOD TYPING SEROLOGIC RH(D): CPT

## 2024-08-12 PROCEDURE — 85025 COMPLETE CBC W/AUTO DIFF WBC: CPT

## 2024-08-12 PROCEDURE — 85610 PROTHROMBIN TIME: CPT

## 2024-08-12 PROCEDURE — 85730 THROMBOPLASTIN TIME PARTIAL: CPT

## 2024-08-12 PROCEDURE — 86900 BLOOD TYPING SEROLOGIC ABO: CPT

## 2024-08-12 PROCEDURE — 80048 BASIC METABOLIC PNL TOTAL CA: CPT

## 2024-08-12 PROCEDURE — 86850 RBC ANTIBODY SCREEN: CPT
